# Patient Record
Sex: FEMALE | Race: OTHER | Employment: FULL TIME | URBAN - METROPOLITAN AREA
[De-identification: names, ages, dates, MRNs, and addresses within clinical notes are randomized per-mention and may not be internally consistent; named-entity substitution may affect disease eponyms.]

---

## 2019-10-29 ENCOUNTER — HOSPITAL ENCOUNTER (OUTPATIENT)
Dept: PHYSICAL THERAPY | Age: 69
Discharge: HOME | End: 2019-10-29
Payer: MEDICARE

## 2019-10-29 PROCEDURE — 97140 MANUAL THERAPY 1/> REGIONS: CPT

## 2019-10-29 PROCEDURE — 97162 PT EVAL MOD COMPLEX 30 MIN: CPT

## 2019-10-29 PROCEDURE — 97110 THERAPEUTIC EXERCISES: CPT

## 2019-10-29 NOTE — PROGRESS NOTES
PT DAILY TREATMENT NOTE 10-18    Patient Name: eHidi Zhu  Date:10/29/2019  : 1950  [x]  Patient  Verified  Payor: Roddy Jacobo / Plan: [place] MEDICARE PART A & B / Product Type: Medicare /    In time:1004  Out time:1055  Total Treatment Time (min): 51  Visit #: 1 of 16    Medicare/BCBS Only   Total Timed Codes (min):  23 1:1 Treatment Time:  51       Treatment Area: Right knee pain [M25.561]    Physical Therapy Evaluation - Knee    SUBJECTIVE      Any medication changes, allergies to medications, adverse drug reactions, diagnosis change, or new procedure performed?: [x] No    [] Yes (see summary sheet for update)    Subjective functional status/changes:     PLOF: (I) Functional ADLs, (I) Self-Care ADLs, Work Full-Time, Driving, Ambulation without AD, No Fall History  Current Functional Status: (I) Driving  Work Hx: [employer] - Return back 10/28/2019 (primarily sedentary)  Living Situation: Lives in [facility] ([facility])  Comorbidities: Arthritis, Diabetes Type II, HTN,   Medications: Vicodin (PRN)    Objective: Patient presents s/p right knee patellofemoral resurfacing (DoS 10/1/2019) secondary to chronic functional limitations and pain. Paitent denies the following: s/s of infection, s/s of DVT/PE, falls, N/T, knee buckling. Patient reports an uncomplicated post-surgical recovery with receival of home health PT x2 weeks prior to vacation x10 days with patient return to [facility] 10/27/2019. Patient reports return back to work 10/28/2019 with patient primarily working a sedentary desk-job. Patient reports ambulation without AD. Patient reports continued complication of HEP as prescribed with ambulation up to 1 mile/day with work-related ADLs.     Pain Intensity (0-10, VAS): Current 0, Worst 7, Best 0    Patient Goals: \"I would like to gain more motion in my knee\"    OBJECTIVE EXAMINATION    BP: 138/82 mmHg  Posture/Observation:   Static Standing: Slight unweighting of the right LE    Gait: Right antalgic gait pattern without AD  Functional Tests:  1. SLS: Left 26 sec / Right 30 sec  2. Sit-to-Stand (low table, no UE assist): Ability to perform without UE assists    Surgical Incision: Appropriate healing of surgical incision without active nor dried drainage. Neurovascular Screen: Generalized intact sensation to light touch to bilateral lower leg and foot, Non-tender, supple calf upon palpation, 2+ Posterior tibialis pulse, 2+ Dorsalis pedis pulse    ROM / Strength [] Unable to assess                                                                             AROM                     MMT (1-5)    Left Right Left Right   Hip Flexion   5 5    Extension   NT NT    Abduction   3+ 3+    ER   5 5    IR   5 5   Knee Flexion 138 117 5 5    Extension 5(0) (0)5 5 4+   Ankle Dorsiflexion   5 5   *Assessed in supine    Palpation:   Warmth: Minimal warmth isolated to knee   Edema: Minimal non-pitting edema localized to right knee    Patellar Assessment:  Patellar Mobility:   R Patella: Hypomobile medial/lateral glide    Girth Measurements:    Joint line   Right  46 cm     OBJECTIVE    28 min [x]Eval                  []Re-Eval     15 min Therapeutic Exercise:  [x] See flow sheet : Patient educated regarding completion of prescribed HEP and provided with written HEP instructions, Patient educated regarding diagnosis and PT POC   Rationale: increase ROM and increase strength to improve the patients ability to improve ease with functional ADLs    8 min Manual Therapy:    Supine, Left Patellar Superior Grade I-II Mobilization (OPP)  Supine, Left Femur AP Grade I-II Mobilization (OPP)  Supine, Left Tibial Distraction (OPP)   Rationale: decrease pain, increase ROM, increase tissue extensibility and decrease edema  to improve ease with stair management.           With   [] TE   [] TA   [] neuro   [] other: Patient Education: [x] Review HEP    [] Progressed/Changed HEP based on:   [] positioning   [] body mechanics   [] transfers   [] heat/ice application    [] other:      Other Objective/Functional Measures: See objective above. Pain Level (0-10 scale) post treatment: 0    ASSESSMENT/Changes in Function: Patient presents s/p right knee patellofemoral resurfacing (DoS 10/1/2019) secondary to chronic pain and functional limitations. Normal neurovascular screen with appropriate healing of surgical incision. Right knee AROM 0-5-117 degrees with poor activation of quadriceps musculature and right antalgic gait with ambulation. To emphasize return of functional knee AROM and strength to promote patient ease with return to PLOF. Patient will continue to benefit from skilled PT services to modify and progress therapeutic interventions, address functional mobility deficits, address ROM deficits, address strength deficits, analyze and address soft tissue restrictions, analyze and cue movement patterns, analyze and modify body mechanics/ergonomics, assess and modify postural abnormalities, address imbalance/dizziness and instruct in home and community integration to attain remaining goals. [x]  See Plan of Care  []  See progress note/recertification  []  See Discharge Summary         Progress towards goals / Updated goals:    Short Term Goals: To be accomplished in 3-4 weeks:  1. Patient will subjectively report full compliance with prescribed HEP. Eval: HEP provided  2. Patient will demonstrate right knee AROM 0-130 degrees to improve ease with dressing. Eval: Right Knee AROM 0 - 5 - 117 degrees  3. Patient will demonstrate ability to perform supine SLR x10 without quadriceps lag to improve ease with curb management. Eval: Supine Quadriceps Isometric: Poor activation of quadriceps with gluteal compensation    Long Term Goals: To be accomplished in 7-8 weeks:  1. Patient will demonstrate a significant functional improvement as demonstrated by a score of >/= 73 on FOTO. Eval: FOTO = 72  2.  Patient will demonstrate left/right SLS (Airex) >/= 30 seconds to improve stability on uneven surfaces. Eval: Left SLS (non-Airex) = 26 sec / Right SLS (non-Airex) = 30 sec  3. Patient will demonstrate left/right hip abduction MMT >/= 4+/5 to improve stability with recreational ADLs.   Eval: Left Hip Abduction MMT = 3+/5, Right Hip Abduction MMT = 3+/5    PLAN  [x]  Upgrade activities as tolerated     []  Continue plan of care  []  Update interventions per flow sheet       []  Discharge due to:_  []  Other:_      Nora Aguilar, PT 10/29/2019  8:32 AM    Future Appointments   Date Time Provider Jt Byrnes   10/29/2019 10:00 AM Ángel Childers, PT [facility]

## 2019-10-31 ENCOUNTER — HOSPITAL ENCOUNTER (OUTPATIENT)
Dept: PHYSICAL THERAPY | Age: 69
Discharge: HOME | End: 2019-10-31
Payer: MEDICARE

## 2019-10-31 PROCEDURE — 97110 THERAPEUTIC EXERCISES: CPT

## 2019-10-31 PROCEDURE — 97112 NEUROMUSCULAR REEDUCATION: CPT

## 2019-10-31 PROCEDURE — 97140 MANUAL THERAPY 1/> REGIONS: CPT

## 2019-11-05 ENCOUNTER — HOSPITAL ENCOUNTER (OUTPATIENT)
Dept: PHYSICAL THERAPY | Age: 69
Discharge: HOME | End: 2019-11-05
Payer: MEDICARE

## 2019-11-05 PROCEDURE — 97112 NEUROMUSCULAR REEDUCATION: CPT

## 2019-11-05 PROCEDURE — 97140 MANUAL THERAPY 1/> REGIONS: CPT

## 2019-11-05 PROCEDURE — 97110 THERAPEUTIC EXERCISES: CPT

## 2019-11-05 NOTE — PROGRESS NOTES
PT DAILY TREATMENT NOTE 10-18    Patient Name: Lory Gates  Date:2019  : 1950  [x]  Patient  Verified  Payor: VA MEDICARE / Plan: VA MEDICARE PART A & B / Product Type: Medicare /    In time:350  Out time:442  Total Treatment Time (min): 46  Visit #: 3 of 16    Medicare/BCBS Only   Total Timed Codes (min):  52 1:1 Treatment Time:  40       Treatment Area: Right knee pain [M25.561]    SUBJECTIVE  Pain Level (0-10 scale): 1  Any medication changes, allergies to medications, adverse drug reactions, diagnosis change, or new procedure performed?: [x] No    [] Yes (see summary sheet for update)  Subjective functional status/changes:   [] No changes reported  Patient reports continued pain primarily along the medial aspect of her knee. OBJECTIVE    25 min Therapeutic Exercise:  [x] See flow sheet : Emphasis placed on improving available knee AROM and LE strength   Rationale: increase ROM and increase strength to improve the patients ability to improve ease with functional ADLs     17 min Neuromuscular Re-education:  [x]  See flow sheet : Emphasis placed on improving LE proprioceptive and kinesthetic awareness and static and dynamic stability   Rationale: increase ROM, increase strength, improve balance and increase proprioception  to improve the patients ability to improve ease with work-related ADLs     10 min Manual Therapy:    Supine, Left Patellar Superior Grade I-II Mobilization (OPP)  Supine, Left Femur AP Grade I-II Mobilization (OPP)  Supine, Left Tibial Distraction (OPP, with knee extension)   Rationale: decrease pain, increase ROM, increase tissue extensibility and decrease edema  to improve ease with stair management.         With   [] TE   [] TA   [] neuro   [] other: Patient Education: [x] Review HEP    [] Progressed/Changed HEP based on:   [] positioning   [] body mechanics   [] transfers   [] heat/ice application    [] other:      Other Objective/Functional Measures:   Right Knee AROM (post-manual): 0 - 120 degrees     Pain Level (0-10 scale) post treatment: 0    ASSESSMENT/Changes in Function: With ability to achieve right knee AROM 0-120 degrees post-manual with hypertonicity of the medial thigh musculature and palpable \"popping\" at the pes anserine at end-range extension. Provision of updated HEP to include prone knee extension hang to improve return to terminal knee extension ROM. Patient education in completion of scar tissue massage. Patient will continue to benefit from skilled PT services to modify and progress therapeutic interventions, address functional mobility deficits, address ROM deficits, address strength deficits, analyze and address soft tissue restrictions, analyze and cue movement patterns, analyze and modify body mechanics/ergonomics and assess and modify postural abnormalities to attain remaining goals. []  See Plan of Care  []  See progress note/recertification  []  See Discharge Summary         Progress towards goals / Updated goals:    Short Term Goals: To be accomplished in 3-4 weeks:  1. Patient will subjectively report full compliance with prescribed HEP. Eval: HEP provided   Current: Met, HEP performance reported as prescribed, 10/31/2019  2. Patient will demonstrate right knee AROM 0-130 degrees to improve ease with dressing. Eval: Right Knee AROM 0 - 5 - 117 degrees  Current: Remains, Right Knee AROM 0 - 5 - 117 degrees, 11/5/2019  3. Patient will demonstrate ability to perform supine SLR x10 without quadriceps lag to improve ease with curb management. Eval: Supine Quadriceps Isometric: Poor activation of quadriceps with gluteal compensation  Current: Progressing, Supine SLR x10 - Performance with quadriceps lag present, 11/5/2019     Long Term Goals: To be accomplished in 7-8 weeks:  1. Patient will demonstrate a significant functional improvement as demonstrated by a score of >/= 73 on FOTO. Eval: FOTO = 72  2.  Patient will demonstrate left/right SLS (Airex) >/= 30 seconds to improve stability on uneven surfaces. Eval: Left SLS (non-Airex) = 26 sec / Right SLS (non-Airex) = 30 sec  3. Patient will demonstrate left/right hip abduction MMT >/= 4+/5 to improve stability with recreational ADLs.   Eval: Left Hip Abduction MMT = 3+/5, Right Hip Abduction MMT = 3+/5    PLAN  [x]  Upgrade activities as tolerated     [x]  Continue plan of care  []  Update interventions per flow sheet       []  Discharge due to:_  []  Other:_      Pal Talamantes, PT 11/5/2019  8:27 AM    Future Appointments   Date Time Provider Jt Byrnes   11/5/2019  4:00 PM Peterson Bernardo, PT [facility]   11/7/2019  8:00 AM Grant, [address] [facility]   11/11/2019  4:00 PM Serg Abbasi, PT [facility]   11/13/2019  1:30 PM Raj Broderick, PT [facility]

## 2019-11-07 ENCOUNTER — HOSPITAL ENCOUNTER (OUTPATIENT)
Dept: PHYSICAL THERAPY | Age: 69
Discharge: HOME | End: 2019-11-07
Payer: MEDICARE

## 2019-11-07 PROCEDURE — 97110 THERAPEUTIC EXERCISES: CPT

## 2019-11-07 PROCEDURE — 97140 MANUAL THERAPY 1/> REGIONS: CPT

## 2019-11-11 ENCOUNTER — HOSPITAL ENCOUNTER (OUTPATIENT)
Dept: PHYSICAL THERAPY | Age: 69
End: 2019-11-11
Payer: MEDICARE

## 2019-11-13 ENCOUNTER — APPOINTMENT (OUTPATIENT)
Dept: PHYSICAL THERAPY | Age: 69
End: 2019-11-13
Payer: MEDICARE

## 2019-11-13 NOTE — PROGRESS NOTES
[facility]              [address]        [place], [address]   [phone] fax    Discharge Summary  Patient name: Ginette Thurman Start of Care: 10/29/2019   Referral source: Sylvie Rosas MD : 1950               Medical Diagnosis: Right knee pain [M25.561]  Payor: VA MEDICARE / Plan: VA MEDICARE PART A & B / Product Type: Medicare /  Onset Date:DoS 10/1/2019               Treatment Diagnosis: Right Knee Pain, s/p Surgery   Prior Hospitalization: see medical history Provider#: [id number]   Medications: Verified on Patient summary List    Comorbidities: Arthritis, Diabetes Type II, HTN   Prior Level of Function: (I) Functional ADLs, (I) Self-Care ADLs, Work Full-Time, Driving, Ambulation without AD, No Fall History  Visits from [place] of Care: 4    Missed Visits: 2  Reporting Period : 10/29/2019 to 2019    Summary of Care:  Goal: Patient will subjectively report full compliance with prescribed HEP. Status at last note/certification: unable to reassess  Status at discharge: not met    Goal: Patient will demonstrate right knee AROM 0-130 degrees to improve ease with dressing. Status at last note/certification: unable to reassess  Status at discharge: not met    Goal: Patient will demonstrate ability to perform supine SLR x10 without quadriceps lag to improve ease with curb management. Status at last note/certification: unable to reassess  Status at discharge: not met    Goal: Patient will demonstrate a significant functional improvement as demonstrated by a score of >/= 73 on FOTO. Status at last note/certification: unable to reassess  Status at discharge: not met    Goal: Patient will demonstrate left/right SLS (Airex) >/= 30 seconds to improve stability on uneven surfaces.   Status at last note/certification: unable to reassess  Status at discharge: not met    Goal: Patient will demonstrate left/right hip abduction MMT >/= 4+/5 to improve stability with recreational ADLs. Status at last note/certification: unable to reassess  Status at discharge: not met    ASSESSMENT/RECOMMENDATIONS:  Pt was seen for 4 therapy sessions. Per telephone log, the pt called and cancelled all therapy appointments secondary to seeing her MD and he requested d/c. Pt is therefore d/c'ed from therapy and unable to reassess goals at this time.    [x]Discontinue therapy: []Patient has reached or is progressing toward set goals      [x]Patient is non-compliant or has abdicated      []Due to lack of appreciable progress towards set goals    Jonathan Jaramillo, PT 11/13/2019 3:40 PM

## 2020-10-22 ENCOUNTER — TELEPHONE (OUTPATIENT)
Dept: ORTHOPEDIC SURGERY | Age: 70
End: 2020-10-22

## 2020-10-22 ENCOUNTER — OFFICE VISIT (OUTPATIENT)
Dept: ORTHOPEDIC SURGERY | Age: 70
End: 2020-10-22
Payer: MEDICARE

## 2020-10-22 VITALS
DIASTOLIC BLOOD PRESSURE: 81 MMHG | RESPIRATION RATE: 16 BRPM | TEMPERATURE: 97 F | HEIGHT: 65 IN | SYSTOLIC BLOOD PRESSURE: 148 MMHG | OXYGEN SATURATION: 99 % | WEIGHT: 180 LBS | HEART RATE: 78 BPM | BODY MASS INDEX: 29.99 KG/M2

## 2020-10-22 DIAGNOSIS — M47.816 LUMBAR FACET ARTHROPATHY: Primary | ICD-10-CM

## 2020-10-22 DIAGNOSIS — M51.36 DDD (DEGENERATIVE DISC DISEASE), LUMBAR: ICD-10-CM

## 2020-10-22 DIAGNOSIS — R20.0 RIGHT LEG NUMBNESS: ICD-10-CM

## 2020-10-22 DIAGNOSIS — G62.9 NEUROPATHY: ICD-10-CM

## 2020-10-22 PROCEDURE — 99203 OFFICE O/P NEW LOW 30 MIN: CPT | Performed by: PHYSICAL MEDICINE & REHABILITATION

## 2020-10-22 PROCEDURE — G8536 NO DOC ELDER MAL SCRN: HCPCS | Performed by: PHYSICAL MEDICINE & REHABILITATION

## 2020-10-22 PROCEDURE — 1090F PRES/ABSN URINE INCON ASSESS: CPT | Performed by: PHYSICAL MEDICINE & REHABILITATION

## 2020-10-22 PROCEDURE — G8427 DOCREV CUR MEDS BY ELIG CLIN: HCPCS | Performed by: PHYSICAL MEDICINE & REHABILITATION

## 2020-10-22 PROCEDURE — 1101F PT FALLS ASSESS-DOCD LE1/YR: CPT | Performed by: PHYSICAL MEDICINE & REHABILITATION

## 2020-10-22 PROCEDURE — G8432 DEP SCR NOT DOC, RNG: HCPCS | Performed by: PHYSICAL MEDICINE & REHABILITATION

## 2020-10-22 PROCEDURE — G8400 PT W/DXA NO RESULTS DOC: HCPCS | Performed by: PHYSICAL MEDICINE & REHABILITATION

## 2020-10-22 PROCEDURE — G8419 CALC BMI OUT NRM PARAM NOF/U: HCPCS | Performed by: PHYSICAL MEDICINE & REHABILITATION

## 2020-10-22 PROCEDURE — 3017F COLORECTAL CA SCREEN DOC REV: CPT | Performed by: PHYSICAL MEDICINE & REHABILITATION

## 2020-10-22 RX ORDER — GLIPIZIDE 5 MG/1
TABLET, FILM COATED, EXTENDED RELEASE ORAL
COMMUNITY
Start: 2020-07-13

## 2020-10-22 RX ORDER — GABAPENTIN 100 MG/1
CAPSULE ORAL
Qty: 60 CAP | Refills: 2 | Status: SHIPPED | OUTPATIENT
Start: 2020-10-22 | End: 2021-10-12

## 2020-10-22 NOTE — LETTER
10/24/20 Patient: Colton Gates YOB: 1950 Date of Visit: 10/22/2020 Ketty Peterson NP 
[address] [facility] [address] [address] [address] VIA Facsimile: [phone] Dear Ketty Peterson NP, Thank you for referring Ms. Doron Jeffrey to [address] [place] [address] for evaluation. My notes for this consultation are attached. If you have questions, please do not hesitate to call me. I look forward to following your patient along with you. Sincerely, uJlissa Kapadia MD

## 2020-10-22 NOTE — PROGRESS NOTES
[facility]  [address], [facility] [address]  Phone: [phone]  Fax: [phone]    NEW PATIENT  Pt's YOB: 1950    ASSESSMENT   Diagnoses and all orders for this visit:    1. Lumbar facet arthropathy  -     MRI LUMB SPINE WO CONT; Future    2. Neuropathy  -     gabapentin (Neurontin) 100 mg capsule; Take 1 cap by mouth at night for 1 week, then increase to 2 as directed. -     MRI LUMB SPINE WO CONT; Future    3. DDD (degenerative disc disease), lumbar  -     MRI LUMB SPINE WO CONT; Future    4. Right leg numbness  -     MRI LUMB SPINE WO CONT; Future         IMPRESSION AND PLAN:  Inderjit Goldstein is a 78 y.o. female with history of low back pain. She complains of pain across the lower back that is severe when standing for prolonged periods of time. Pt also reports sharp pain in the left buttock and numbness in the right lateral thigh x 3 months. She has used Voltaren 1% gel and Voltaren 75 mg without relief. 1) Pt was given information on lumbar arthritis exercises. 2) A lumbar MRI was ordered. She has progressive lumbar pain with radicular symptoms and difficulty standing/walking despite using NSAID's.  3) Pt was prescribed Neurontin 100 mg 2 tab QHS, tapering up as directed. 4) She was offered a Medrol Dosepak but declined. 5) Ms. Tennille Mason has a reminder for a \"due or due soon\" health maintenance. I have asked that she contact her primary care provider, Federico Patrick NP, for follow-up on this health maintenance. 6)  demonstrated consistency with prescribing. Follow-up and Dispositions    · Return in about 4 weeks (around 11/19/2020) for Diagnostic Test follow up, Medication follow up. HISTORY OF PRESENT ILLNESS:  Inderjit Goldstein is a 78 y.o. female with history of low back pain. Pt presents to the office today as a new patient referred by Dr. Judith Ocampo.  She complains of pain across the lower back that is severe when standing/walking for prolonged periods of time. Pt notes severe lower back when changing positions and difficulty getting into bed secondary to pain. She admits to increased pain when laying supine but reports minimal pain upon waking when she lays on her sides. Pt also reports sharp pain in the left buttock and numbness in the right lateral thigh x 3 months. She reports frequent numbness/tingling in the feet that starts around 6-7 PM and is unsure if this could be due to neuropathy. Pt denies any recent lower extremity EMGs. Of note, she had a right partial knee replacement 10/1/2019 by Dr. Kathyanne Hammans. She denies any weakness in the legs at this time. She had cervical surgery by Dr. Afshin Weaver but denies any previous lumbar surgeries. Pt has used Voltaren 1% gel and Voltaren 75 mg without relief. She denies any previous physical therapy. She has a history of diabetes mellitus and notes that her blood sugars are well controlled at this time. Pt notes that she did not previously tolerate Lyrica but denies ever taking Neurontin. She has a history of [place] Palsy and notes that she has been on prednisone 3 x over the last couple years due to a failed eye surgery. Pt reports that she is now followed by Dr. Cristofer Green. Pt at this time desires to proceed with a lumbar MRI and medication evaluation. She will be leaving tomorrow for a trip to [place], [place]. Pt notes that she will be going on a [employer] train ride.     Pain Scale: 6/10     PCP: Herman Eugene NP    Past Medical History:   Diagnosis Date    Arthritis     Asthma     As a child    Diabetes ([facility] [address]     High cholesterol     Hypertension     Numbness of left hand         Social History     Socioeconomic History    Marital status:      Spouse name: Not on file    Number of children: Not on file    Years of education: Not on file    Highest education level: Not on file   Occupational History    Occupation: office work Comment: at [employer] 8 resource strain: Not on file    Food insecurity     Worry: Not on file     Inability: Not on file   [employer] needs     Medical: Not on file     Non-medical: Not on file   Tobacco Use    Smoking status: Never Smoker    Smokeless tobacco: Never Used   Substance and Sexual Activity    Alcohol use: No    Drug use: No    Sexual activity: Not on file   Lifestyle    Physical activity     Days per week: Not on file     Minutes per session: Not on file    Stress: Not on file   Relationships    Social connections     Talks on phone: Not on file     Gets together: Not on file     Attends Latter-day service: Not on file     Active member of club or organization: Not on file     Attends meetings of clubs or organizations: Not on file     Relationship status: Not on file    Intimate partner violence     Fear of current or ex partner: Not on file     Emotionally abused: Not on file     Physically abused: Not on file     Forced sexual activity: Not on file   Other Topics Concern    Not on file   Social History Narrative    Not on file       Current Outpatient Medications   Medication Sig Dispense Refill    glipiZIDE SR (GLUCOTROL XL) 5 mg CR tablet TAKE 1 TABLET DAILY      gabapentin (Neurontin) 100 mg capsule Take 1 cap by mouth at night for 1 week, then increase to 2 as directed. 60 Cap 2    fenofibrate nanocrystallized (TRICOR) 145 mg tablet 145 mg.      losartan-hydrochlorothiazide (HYZAAR) 100-12.5 mg per tablet Take 1 Tab by mouth daily. 1    LORATADINE PO Take 10 mg by mouth daily as needed.  atorvastatin (LIPITOR) 20 mg tablet Take  by mouth daily.  BABY ASPIRIN PO Take 81 mg by mouth daily.  metFORMIN ER (GLUCOPHAGE XR) 500 mg tablet   6    HYDROcodone-acetaminophen (NORCO) 7.5-325 mg per tablet Take 1 Tab by mouth every eight (8) hours as needed for Pain. Max Daily Amount: 3 Tabs.  60 Tab 0    HYDROcodone-acetaminophen (VICODIN ES) 7.5-300 mg tablet Take 1 Tab by mouth four (4) times daily as needed. Max Daily Amount: 4 Tabs. 30 Tab 0    metFORMIN (GLUCOPHAGE) 500 mg tablet Take  by mouth two (2) times daily (with meals).  FENOFIBRATE PO Take 145 mg by mouth daily. Allergies   Allergen Reactions    Cat Dander Other (comments)     eyes swell       REVIEW OF SYSTEMS    Constitutional: Negative for fever, chills, or weight change. Respiratory: Negative for cough or shortness of breath. Cardiovascular: Negative for chest pain or palpitations. Gastrointestinal: Negative for acid reflux, change in bowel habits, or constipation. Genitourinary: Negative for dysuria and flank pain. Musculoskeletal: Positive for lumbar pain. Skin: Negative for rash. Neurological: Negative for headaches or dizziness, Positive for numbness in the lateral aspect of the right thigh. Endo/Heme/Allergies: Negative for increased bruising. Psychiatric/Behavioral: Negative for difficulty with sleep. PHYSICAL EXAMINATION  Visit Vitals  BP (!) 148/81 (BP 1 Location: Left arm, BP Patient Position: Sitting)   Pulse 78   Temp 97 °F (36.1 °C) (Skin)   Resp 16   Ht 5' 5\" (1.651 m)   Wt 180 lb (81.6 kg)   SpO2 99%   BMI 29.95 kg/m²       Constitutional: Awake, alert, and in no acute distress. HEENT: Normocephalic. Atraumatic. Oropharynx is moist and clear. PERRL. EOMI. Sclerae are nonicteric  Cardiovascular: Regular rate and rhythm  Lungs: Clear to auscultation bilaterally  Abdomen: Soft and nontender. Bowel sounds are present  Neurological: 1+ symmetrical DTRs in the upper extremities. 1+ symmetrical DTRs in the lower extremities. Sensation to light touch is intact. Negative Guzmán's sign bilaterally. Skin: warm, dry, and intact. Musculoskeletal: Tenderness to palpation in the lower lumbar region. Moderate pain with extension and axial loading. No pain with internal or external rotation of her hips. Negative straight leg raise bilaterally. Biceps  Triceps Deltoids Wrist Ext Wrist Flex Hand Intrin   Right +4/5 +4/5 +4/5 +4/5 +4/5 +4/5   Left +4/5 +4/5 +4/5 +4/5 +4/5 +4/5      Hip Flex  Quads Hamstrings Ankle DF EHL Ankle PF   Right +4/5 +4/5 +4/5 +4/5 +4/5 +4/5   Left +4/5 +4/5 +4/5 +4/5 +4/5 +4/5     IMAGING:    Lumbar spine x-rays from 9/22/2020 DR JUAREZ [facility]) were personally reviewed with the patient and demonstrated:  FINDINGS:    VERTEBRAE AND ALIGNMENT: Segments are normal in height and alignment. Minimal L4-5 anterolisthesis. Facet hypertrophy L4-5 and L5-S1 levels. DISC SPACES: Little or no significant disc space narrowing. Marginal osteophytes noted at multiple levels. PARASPINOUS SOFT TISSUES: Unremarkable. ADDITIONAL: None.    _______________    IMPRESSION    Facet hypertrophy lower lumbar spine. Minimal L4-5 anterolisthesis. Minimal marginal osteophytes. Written by Blank Smith, as dictated by Soraida Melton MD.  I, Dr. Soraida Melton confirm that all documentation is accurate.

## 2020-10-22 NOTE — PATIENT INSTRUCTIONS
Low Back Arthritis: Exercises Introduction Here are some examples of typical rehabilitation exercises for your condition. Start each exercise slowly. Ease off the exercise if you start to have pain. Your doctor or physical therapist will tell you when you can start these exercises and which ones will work best for you. When you are not being active, find a comfortable position for rest. Some people are comfortable on the floor or a medium-firm bed with a small pillow under their head and another under their knees. Some people prefer to lie on their side with a pillow between their knees. Don't stay in one position for too long. Take short walks (10 to 20 minutes) every 2 to 3 hours. Avoid slopes, hills, and stairs until you feel better. Walk only distances you can manage without pain, especially leg pain. How to do the exercises Pelvic tilt 1. Lie on your back with your knees bent. 2. \"Brace\" your stomachtighten your muscles by pulling in and imagining your belly button moving toward your spine. 3. Press your lower back into the floor. You should feel your hips and pelvis rock back. 4. Hold for 6 seconds while breathing smoothly. 5. Relax and allow your pelvis and hips to rock forward. 6. Repeat 8 to 12 times. Back stretches 1. Get down on your hands and knees on the floor. 2. Relax your head and allow it to droop. Round your back up toward the ceiling until you feel a nice stretch in your upper, middle, and lower back. Hold this stretch for as long as it feels comfortable, or about 15 to 30 seconds. 3. Return to the starting position with a flat back while you are on your hands and knees. 4. Let your back sway by pressing your stomach toward the floor. Lift your buttocks toward the ceiling. 5. Hold this position for 15 to 30 seconds. 6. Repeat 2 to 4 times. Follow-up care is a key part of your treatment and safety.  Be sure to make and go to all appointments, and call your doctor if you are having problems. It's also a good idea to know your test results and keep a list of the medicines you take. Where can you learn more? Go to http://www.gray.com/ Enter D528 in the search box to learn more about \"Low Back Arthritis: Exercises. \" Current as of: March 2, 2020               Content Version: 12.6 © 2006-2020 [employer]. Care instructions adapted under license by [employer] (which disclaims liability or warranty for this information). If you have questions about a medical condition or this instruction, always ask your healthcare professional. [address] any warranty or liability for your use of this information.

## 2020-10-22 NOTE — TELEPHONE ENCOUNTER
MRI of the Lumbar Spine without contrast is scheduled at [facility], [phone], on 11/11/20, arrive 7:15AM, test 7:45AM. No Medicare pre-authorization required. Patient instructed to obtain disk. Follow-up appointment scheduled with Dr. Gibbs. Patient advised to bring disk.

## 2020-10-29 DIAGNOSIS — M47.816 LUMBAR FACET ARTHROPATHY: ICD-10-CM

## 2020-10-29 DIAGNOSIS — R20.0 RIGHT LEG NUMBNESS: ICD-10-CM

## 2020-10-29 DIAGNOSIS — M51.36 DDD (DEGENERATIVE DISC DISEASE), LUMBAR: ICD-10-CM

## 2020-10-29 DIAGNOSIS — G62.9 NEUROPATHY: ICD-10-CM

## 2020-11-16 ENCOUNTER — OFFICE VISIT (OUTPATIENT)
Dept: ORTHOPEDIC SURGERY | Age: 70
End: 2020-11-16
Payer: MEDICARE

## 2020-11-16 VITALS
HEIGHT: 65 IN | HEART RATE: 81 BPM | WEIGHT: 180 LBS | TEMPERATURE: 97.7 F | RESPIRATION RATE: 26 BRPM | SYSTOLIC BLOOD PRESSURE: 154 MMHG | BODY MASS INDEX: 29.99 KG/M2 | OXYGEN SATURATION: 98 % | DIASTOLIC BLOOD PRESSURE: 78 MMHG

## 2020-11-16 DIAGNOSIS — M62.838 MUSCLE SPASM: ICD-10-CM

## 2020-11-16 DIAGNOSIS — M51.36 BULGE OF LUMBAR DISC WITHOUT MYELOPATHY: ICD-10-CM

## 2020-11-16 DIAGNOSIS — M54.16 LUMBAR RADICULOPATHY: ICD-10-CM

## 2020-11-16 DIAGNOSIS — M47.816 LUMBAR FACET ARTHROPATHY: Primary | ICD-10-CM

## 2020-11-16 PROCEDURE — 1101F PT FALLS ASSESS-DOCD LE1/YR: CPT | Performed by: PHYSICAL MEDICINE & REHABILITATION

## 2020-11-16 PROCEDURE — G8432 DEP SCR NOT DOC, RNG: HCPCS | Performed by: PHYSICAL MEDICINE & REHABILITATION

## 2020-11-16 PROCEDURE — 3017F COLORECTAL CA SCREEN DOC REV: CPT | Performed by: PHYSICAL MEDICINE & REHABILITATION

## 2020-11-16 PROCEDURE — G8536 NO DOC ELDER MAL SCRN: HCPCS | Performed by: PHYSICAL MEDICINE & REHABILITATION

## 2020-11-16 PROCEDURE — G8427 DOCREV CUR MEDS BY ELIG CLIN: HCPCS | Performed by: PHYSICAL MEDICINE & REHABILITATION

## 2020-11-16 PROCEDURE — G8419 CALC BMI OUT NRM PARAM NOF/U: HCPCS | Performed by: PHYSICAL MEDICINE & REHABILITATION

## 2020-11-16 PROCEDURE — 99214 OFFICE O/P EST MOD 30 MIN: CPT | Performed by: PHYSICAL MEDICINE & REHABILITATION

## 2020-11-16 PROCEDURE — 1090F PRES/ABSN URINE INCON ASSESS: CPT | Performed by: PHYSICAL MEDICINE & REHABILITATION

## 2020-11-16 PROCEDURE — G8400 PT W/DXA NO RESULTS DOC: HCPCS | Performed by: PHYSICAL MEDICINE & REHABILITATION

## 2020-11-16 RX ORDER — CELECOXIB 200 MG/1
200 CAPSULE ORAL 2 TIMES DAILY WITH MEALS
Qty: 60 CAP | Refills: 1 | Status: SHIPPED | OUTPATIENT
Start: 2020-11-16 | End: 2021-10-12

## 2020-11-16 RX ORDER — TOPIRAMATE 25 MG/1
TABLET ORAL
Qty: 90 TAB | Refills: 1 | Status: SHIPPED | OUTPATIENT
Start: 2020-11-16 | End: 2021-10-12

## 2020-11-16 NOTE — PATIENT INSTRUCTIONS
Low Back Arthritis: Exercises Introduction Here are some examples of typical rehabilitation exercises for your condition. Start each exercise slowly. Ease off the exercise if you start to have pain. Your doctor or physical therapist will tell you when you can start these exercises and which ones will work best for you. When you are not being active, find a comfortable position for rest. Some people are comfortable on the floor or a medium-firm bed with a small pillow under their head and another under their knees. Some people prefer to lie on their side with a pillow between their knees. Don't stay in one position for too long. Take short walks (10 to 20 minutes) every 2 to 3 hours. Avoid slopes, hills, and stairs until you feel better. Walk only distances you can manage without pain, especially leg pain. How to do the exercises Pelvic tilt 1. Lie on your back with your knees bent. 2. \"Brace\" your stomachtighten your muscles by pulling in and imagining your belly button moving toward your spine. 3. Press your lower back into the floor. You should feel your hips and pelvis rock back. 4. Hold for 6 seconds while breathing smoothly. 5. Relax and allow your pelvis and hips to rock forward. 6. Repeat 8 to 12 times. Back stretches 1. Get down on your hands and knees on the floor. 2. Relax your head and allow it to droop. Round your back up toward the ceiling until you feel a nice stretch in your upper, middle, and lower back. Hold this stretch for as long as it feels comfortable, or about 15 to 30 seconds. 3. Return to the starting position with a flat back while you are on your hands and knees. 4. Let your back sway by pressing your stomach toward the floor. Lift your buttocks toward the ceiling. 5. Hold this position for 15 to 30 seconds. 6. Repeat 2 to 4 times. Follow-up care is a key part of your treatment and safety.  Be sure to make and go to all appointments, and call your doctor if you are having problems. It's also a good idea to know your test results and keep a list of the medicines you take. Where can you learn more? Go to http://www.gray.com/ Enter D547 in the search box to learn more about \"Low Back Arthritis: Exercises. \" Current as of: March 2, 2020               Content Version: 12.6 © 2006-2020 [employer]. Care instructions adapted under license by [employer] (which disclaims liability or warranty for this information). If you have questions about a medical condition or this instruction, always ask your healthcare professional. [address] any warranty or liability for your use of this information. Sacroiliac Pain: Exercises Introduction Here are some examples of exercises for you to try. The exercises may be suggested for a condition or for rehabilitation. Start each exercise slowly. Ease off the exercises if you start to have pain. You will be told when to start these exercises and which ones will work best for you. How to do the exercises Knee-to-chest stretch 1. Do not do the knee-to-chest exercise if it causes or increases back or leg pain. 2. Lie on your back with your knees bent and your feet flat on the floor. You can put a small pillow under your head and neck if it is more comfortable. 3. Grasp your hands under one knee and bring the knee to your chest, keeping the other foot flat on the floor. 4. Keep your lower back pressed to the floor. Hold for at least 15 to 30 seconds. 5. Relax and lower the knee to the starting position. Repeat with the other leg. 6. Repeat 2 to 4 times with each leg. 7. To get more stretch, keep your other leg flat on the floor while pulling your knee to your chest.   
Bridging 1. Lie on your back with both knees bent. Your knees should be bent about 90 degrees. 2. Tighten your belly muscles by pulling in your belly button toward your spine. Then push your feet into the floor, squeeze your buttocks, and lift your hips off the floor until your shoulders, hips, and knees are all in a straight line. 3. Hold for about 6 seconds as you continue to breathe normally, and then slowly lower your hips back down to the floor and rest for up to 10 seconds. 4. Repeat 8 to 12 times. Hip extension 1. Get down on your hands and knees on the floor. 2. Keeping your back and neck straight, lift one leg straight out behind you. When you lift your leg, keep your hips level. Don't let your back twist, and don't let your hip drop toward the floor. 3. Hold for 6 seconds. Repeat 8 to 12 times with each leg. 4. If you feel steady and strong when you do this exercise, you can make it more difficult. To do this, when you lift your leg, also lift the opposite arm straight out in front of you. For example, lift the left leg and the right arm at the same time. (This is sometimes called the \"bird dog exercise. \") Hold for 6 seconds, and repeat 8 to 12 times on each side. Clamshell 1. Lie on your side with a pillow under your head. Keep your feet and knees together and your knees bent. 2. Raise your top knee, but keep your feet together. Do not let your hips roll back. Your legs should open up like a clamshell. 3. Hold for 6 seconds. 4. Slowly lower your knee back down. Rest for 10 seconds. 5. Repeat 8 to 12 times. 6. Switch to your other side and repeat steps 1 through 5. Hamstring wall stretch 1. Lie on your back in a doorway, with one leg through the open door. 2. Slide your affected leg up the wall to straighten your knee. You should feel a gentle stretch down the back of your leg. 3. Hold the stretch for at least 1 minute to begin. Then try to lengthen the time you hold the stretch to as long as 6 minutes. 4. Switch legs, and repeat steps 1 through 3. 5. Repeat 2 to 4 times. 6. If you do not have a place to do this exercise in a doorway, there is another way to do it: 
7. Lie on your back, and bend one knee. 8. Loop a towel under the ball and toes of that foot, and hold the ends of the towel in your hands. 9. Straighten your knee, and slowly pull back on the towel. You should feel a gentle stretch down the back of your leg. 10. Switch legs, and repeat steps 1 through 3. 
11. Repeat 2 to 4 times. 1. Do not arch your back. 2. Do not bend either knee. 3. Keep one heel touching the floor and the other heel touching the wall. Do not point your toes. Lower abdominal strengthening 1. Lie on your back with your knees bent and your feet flat on the floor. 2. Tighten your belly muscles by pulling your belly button in toward your spine. 3. Lift one foot off the floor and bring your knee toward your chest, so that your knee is straight above your hip and your leg is bent like the letter \"L. \" 
4. Lift the other knee up to the same position. 5. Lower one leg at a time to the starting position. 6. Keep alternating legs until you have lifted each leg 8 to 12 times. 7. Be sure to keep your belly muscles tight and your back still as you are moving your legs. Be sure to breathe normally. Piriformis stretch 1. Lie on your back with your legs straight. 2. Lift your affected leg, and bend your knee. With your opposite hand, reach across your body, and then gently pull your knee toward your opposite shoulder. 3. Hold the stretch for 15 to 30 seconds. 4. Switch legs and repeat steps 1 through 3. 
5. Repeat 2 to 4 times. Follow-up care is a key part of your treatment and safety. Be sure to make and go to all appointments, and call your doctor if you are having problems. It's also a good idea to know your test results and keep a list of the medicines you take. Where can you learn more? Go to http://www.[employer].Fariqak/ Enter Y366 in the search box to learn more about \"Sacroiliac Pain: Exercises. \" Current as of: March 2, 2020               Content Version: 12.6 © 1027-7379 [employer]. Care instructions adapted under license by [employer] (which disclaims liability or warranty for this information). If you have questions about a medical condition or this instruction, always ask your healthcare professional. [place] [address] any warranty or liability for your use of this information.

## 2020-11-16 NOTE — PROGRESS NOTES
[facility]  [address] [place], [facility] [address]  Phone: [phone]  Fax: [phone]    Pt's YOB: 1950    ASSESSMENT   Diagnoses and all orders for this visit:    1. Lumbar facet arthropathy  -     celecoxib (CeleBREX) 200 mg capsule; Take 1 Cap by mouth two (2) times daily (with meals). 2. Lumbar radiculopathy  -     topiramate (TOPAMAX) 25 mg tablet; Take 1 in the evening for 1 week, then increase to 2 the second week and continue with 3 in the evening    3. Muscle spasm    4. Bulge of lumbar disc without myelopathy    5. BMI 29.0-29.9,adult         IMPRESSION AND PLAN:  Federico Peterson is a 78 y.o. female with history of lumbar pain. She complains of pain across the lower back that is severe when standing for prolonged periods of time. Pt also reports sharp pain in the left buttock that occasionally radiates down the left leg to the calf. She did not tolerate Neurontin. 1) Pt was given information on lumbar arthritis and sacroiliac exercises. 2) Discussed treatment options with the patient including medication, physical therapy, steroid injections, and a lumbar RFA. 3) She was offered a referral to aquatic lumbar physical therapy but declined due to her work schedule. 4) Pt was prescribed Topamax 25 mg 3 tabs QHS, tapering up as directed. 5) She may use an OTC lumbar support intermittently as needed. 6) Pt was prescribed Celebrex 200 mg 1 cap . 7) Ms. Darden has a reminder for a \"due or due soon\" health maintenance. I have asked that she contact her primary care provider, Sameera Phipps NP, for follow-up on this health maintenance. 8)  demonstrated consistency with prescribing.   Follow-up and Dispositions    · Return in about 4 weeks (around 12/14/2020) for Medication follow up.         30 minutes of face to face contact was spent with the patient during today's office visit extensively discussing her symptoms, medications, reviewing her MRI, discussing various therapeutics and treatment plan. HISTORY OF PRESENT ILLNESS:  Inderjit Goldstein is a 78 y.o. female with history of lumbar pain and presents to the office today for MRI follow up. She complains of pain across the lower back that is severe when standing for prolonged periods of time. Pt also reports sharp pain in the left buttock that occasionally radiates down the left leg to the calf. Pt notes that she often has to take a break while washing dishes to sit down secondary to pain. She also reports pain when laying supine for extended periods of time. Pt denies previously attending lumbar physical therapy. She admits that her work schedule at the [place] will likely interfere with her ability to attend physical therapy. Pt started Neurontin 100 mg 2 caps QHS since her last office visit but admits to feeling loopy while on the medication. She has used Voltaren 75 mg and Voltaren gel without relief. Pt denies any allergies to sulfa and has never taking Celebrex. She admits that she has only experienced relief when taking hydrocodone. Pt at this time desires to proceed with medication evaluation.     Pain Scale: 3/10    PCP: Federico Patrick NP     Past Medical History:   Diagnosis Date    Arthritis     Asthma     As a child    Diabetes ([address]     High cholesterol     Hypertension     Numbness of left hand         Social History     Socioeconomic History    Marital status:      Spouse name: Not on file    Number of children: Not on file    Years of education: Not on file    Highest education level: Not on file   Occupational History    Occupation: office work     Comment: at [address] Financial resource strain: Not on file    Food insecurity     Worry: Not on file     Inability: Not on file   Shopular needs     Medical: Not on file     Non-medical: Not on file   Tobacco Use    Smoking status: Never Smoker    Smokeless tobacco: Never Used   Substance and Sexual Activity    Alcohol use: No    Drug use: No    Sexual activity: Not on file   Lifestyle    Physical activity     Days per week: Not on file     Minutes per session: Not on file    Stress: Not on file   Relationships    Social connections     Talks on phone: Not on file     Gets together: Not on file     Attends Gnosticism service: Not on file     Active member of club or organization: Not on file     Attends meetings of clubs or organizations: Not on file     Relationship status: Not on file    Intimate partner violence     Fear of current or ex partner: Not on file     Emotionally abused: Not on file     Physically abused: Not on file     Forced sexual activity: Not on file   Other Topics Concern    Not on file   Social History Narrative    Not on file       Current Outpatient Medications   Medication Sig Dispense Refill    topiramate (TOPAMAX) 25 mg tablet Take 1 in the evening for 1 week, then increase to 2 the second week and continue with 3 in the evening 90 Tab 1    celecoxib (CeleBREX) 200 mg capsule Take 1 Cap by mouth two (2) times daily (with meals). 60 Cap 1    glipiZIDE SR (GLUCOTROL XL) 5 mg CR tablet TAKE 1 TABLET DAILY      fenofibrate nanocrystallized (TRICOR) 145 mg tablet 145 mg.      losartan-hydrochlorothiazide (HYZAAR) 100-12.5 mg per tablet Take 1 Tab by mouth daily. 1    LORATADINE PO Take 10 mg by mouth daily as needed.  atorvastatin (LIPITOR) 20 mg tablet Take 20 mg by mouth daily.  gabapentin (Neurontin) 100 mg capsule Take 1 cap by mouth at night for 1 week, then increase to 2 as directed. 60 Cap 2    metFORMIN ER (GLUCOPHAGE XR) 500 mg tablet   6    HYDROcodone-acetaminophen (NORCO) 7.5-325 mg per tablet Take 1 Tab by mouth every eight (8) hours as needed for Pain. Max Daily Amount: 3 Tabs. 60 Tab 0    HYDROcodone-acetaminophen (VICODIN ES) 7.5-300 mg tablet Take 1 Tab by mouth four (4) times daily as needed.  Max Daily Amount: 4 Tabs. 30 Tab 0    metFORMIN (GLUCOPHAGE) 500 mg tablet Take  by mouth two (2) times daily (with meals).  FENOFIBRATE PO Take 145 mg by mouth daily.  BABY ASPIRIN PO Take 81 mg by mouth daily. Allergies   Allergen Reactions    Cat Dander Other (comments)     eyes swell         REVIEW OF SYSTEMS    Constitutional: Negative for fever, chills, or weight change. Respiratory: Negative for cough or shortness of breath. Cardiovascular: Negative for chest pain or palpitations. Gastrointestinal: Negative for acid reflux, change in bowel habits, or constipation. Genitourinary: Negative for dysuria and flank pain. Musculoskeletal: Positive for lumbar pain. Neurological: Negative for headaches or dizziness. Positive for numbness. Psychiatric/Behavioral: Negative for difficulty with sleep. As per HPI    PHYSICAL EXAMINATION  Visit Vitals  BP (!) 154/78 (BP 1 Location: Left arm, BP Patient Position: Sitting) Comment: pt asymptomatic, MD aware. pt has not taken bp meds yet   Pulse 81   Temp 97.7 °F (36.5 °C) (Skin)   Resp 26   Ht 5' 5\" (1.651 m)   Wt 180 lb (81.6 kg)   SpO2 98% Comment: RA   BMI 29.95 kg/m²       Constitutional: Awake, alert, and in no acute distress. Neurological: 1+ symmetrical DTRs in the upper extremities. 1+ symmetrical DTRs in the lower extremities. Sensation to light touch is intact. Negative Guzmán's sign bilaterally. Skin: warm, dry, and intact. Musculoskeletal: Tenderness to palpation in the lower lumbar region. Moderate pain with extension. No pain with axial loading. Improvement with forward flexion. Tenderness to palpation over the sacroiliac joints. Pain when transitioning from sit to stand. No tenderness to palpation over the greater trochanters. No pain with internal or external rotation of her hips. Negative straight leg raise bilaterally.      Hip Flex  Quads Hamstrings Ankle DF EHL Ankle PF   Right +4/5 +4/5 +4/5 +4/5 +4/5 +4/5   Left +4/5 +4/5 +4/5 +4/5 +4/5 +4/5     IMAGING:    Lumbar spine MRI from 11/11/2020 were personally reviewed with the patient and demonstrated:  FINDINGS:       IMAGE QUALITY:  Diagnostic. SEGMENTAL DESIGNATION:  The numbering scheme used in this dictation is based upon the assumption of 5 lumbar segments. INTRADURAL:  The conus terminates at L1 and is normal. The nerve roots have a normal distribution in the thecal sac without evidence of arachnoiditis. SPINAL CURVATURE (SUPINE):  Trace broad dextroconvex lumbar spinal curvature. Normal lordosis. SPINAL COLUMN AND MUSCULATURE:  The background bone marrow signal is unremarkable. There are no acute (T2 STIR positive) or chronic compression fractures. The posterior paraspinous musculature has low-grade fatty infiltration. L5-S1:            Alignment (supine): Unremarkable. Intervertebral disc:  Unremarkable. Epidural lipomatosis:  None significant. Ligamentum flavum thickening/buckling:  None significant. Central spinal canal stenosis:  None significant. Lateral recess stenosis:                         Right:  None significant. Left:  None significant. Facet joint arthrosis:                         Right:  Moderate. Left:  Low-grade. Neural foraminal stenosis:                   Right:  None significant. Left:  None significant. Baastrup-type interspinous process arthrosis:  None significant. L4-L5:            Alignment (supine):  2 mm L4 anterolisthesis secondary to degenerative facet arthropathy. .            Intervertebral disc: Moderate to marked desiccation. Trace circumferential bulging. Mild height loss. Epidural lipomatosis:  None significant. Ligamentum flavum thickening/buckling:  Low-grade.             Central spinal canal stenosis:  Low-grade. Lateral recess stenosis:                         Right:  High-grade. Potential right L5 nerve root impingement. Left:  High-grade. Potential left L5 nerve root impingement. Facet joint arthrosis:                         Right:  Marked, small effusion, no edema. Left:  Marked, without effusion or edema. Neural foraminal stenosis:                   Right:  None significant. Left:  None significant. Baastrup-type interspinous process arthrosis:  None significant. L3-L4:            Alignment (supine): Unremarkable. Intervertebral disc: Moderate desiccation. Epidural lipomatosis:  None significant. Ligamentum flavum thickening/buckling:  None significant. Central spinal canal stenosis:  None significant. Lateral recess stenosis:                         Right:  None significant. Left:  None significant. Facet joint arthrosis:                         Right:  None significant apparent on MRI. Left:  None significant apparent on MRI. Neural foraminal stenosis:                   Right:  None significant. Left:  None significant. Baastrup-type interspinous process arthrosis:  None significant. L2-L3:            Alignment (supine): Unremarkable. Intervertebral disc: Moderate desiccation. Epidural lipomatosis:  None significant. Ligamentum flavum thickening/buckling:  None significant. Central spinal canal stenosis:  None significant. Lateral recess stenosis:                         Right:  None significant. Left:  None significant.                      Facet joint arthrosis: Right:  None significant apparent on MRI. Left:  None significant apparent on MRI. Neural foraminal stenosis:                   Right:  None significant. Left:  None significant. Baastrup-type interspinous process arthrosis:  None significant. L1-L2:            Alignment (supine): Unremarkable. Intervertebral disc: Moderate desiccation. Epidural lipomatosis:  None significant. Ligamentum flavum thickening/buckling:  None significant. Central spinal canal stenosis:  None significant. Lateral recess stenosis:                         Right:  None significant. Left:  None significant. Facet joint arthrosis:                         Right:  None significant apparent on MRI. Left:  None significant apparent on MRI. Neural foraminal stenosis:                   Right:  None significant. Left:  None significant. Baastrup-type interspinous process arthrosis:  None significant. T12-L1:            Alignment (supine): Unremarkable. Intervertebral disc: Moderate desiccation. Central spinal canal stenosis:  None significant. Neural foraminal stenosis:                   Right:  None significant. Left:  None significant. SACROILIAC JOINTS:  Low grade arthrosis in the partially imaged upper portions. IMPRESSION    1. Advanced degenerative change at L4-L5.          --Marked bilateral degenerative facet arthropathy with mild L4 anterolisthesis. --High-grade bilateral lateral recess stenosis about the descending L5 nerve roots. 2.  Trace scoliosis with asymmetric moderate right L5-S1 degenerative facet arthropathy.       Written by Gattis Merlin, as dictated by Jadyn Carlson MD.  I, Dr. Jadyn Carlson confirm that all documentation is accurate.

## 2020-11-16 NOTE — PROGRESS NOTES
Adis Evans presents today for   Chief Complaint   Patient presents with    Buttocks pain     left       Is someone accompanying this pt? no    Is the patient using any DME equipment during OV? no    Depression Screening:  3 most recent PHQ Screens 10/22/2020   PHQ Not Done Patient Decline       Fall Risk  Fall Risk Assessment, last 12 mths 10/22/2020   Able to walk? Yes   Fall in past 12 months? No         Coordination of Care:  1. Have you been to the ER, urgent care clinic since your last visit? no  Hospitalized since your last visit? no    2. Have you seen or consulted any other health care providers outside of the [address] [place] [address] since your last visit? no Include any pap smears or colon screening.  no

## 2020-11-16 NOTE — LETTER
11/18/20 Patient: Marcial Muñoz YOB: 1950 Date of Visit: 11/16/2020 Seth Cardozo NP 
[address] [facility] [address] [address] [address] VIA Facsimile: [phone] Dear Seth Cardozo NP, Thank you for referring Ms. Nadira Drake to [facility] for evaluation. My notes for this consultation are attached. If you have questions, please do not hesitate to call me. I look forward to following your patient along with you. Sincerely, Rut Renner MD

## 2020-12-08 ENCOUNTER — OFFICE VISIT (OUTPATIENT)
Dept: ORTHOPEDIC SURGERY | Age: 70
End: 2020-12-08
Payer: MEDICARE

## 2020-12-08 VITALS
TEMPERATURE: 97.6 F | HEIGHT: 65 IN | OXYGEN SATURATION: 99 % | BODY MASS INDEX: 29.99 KG/M2 | HEART RATE: 75 BPM | RESPIRATION RATE: 25 BRPM | SYSTOLIC BLOOD PRESSURE: 172 MMHG | WEIGHT: 180 LBS | DIASTOLIC BLOOD PRESSURE: 82 MMHG

## 2020-12-08 DIAGNOSIS — M62.838 MUSCLE SPASM: ICD-10-CM

## 2020-12-08 DIAGNOSIS — R03.0 ELEVATED BLOOD PRESSURE READING: ICD-10-CM

## 2020-12-08 DIAGNOSIS — M54.16 LUMBAR RADICULOPATHY: ICD-10-CM

## 2020-12-08 DIAGNOSIS — M70.62 TROCHANTERIC BURSITIS OF LEFT HIP: Primary | ICD-10-CM

## 2020-12-08 DIAGNOSIS — M17.12 PRIMARY OSTEOARTHRITIS OF LEFT KNEE: ICD-10-CM

## 2020-12-08 DIAGNOSIS — M47.816 LUMBAR FACET ARTHROPATHY: ICD-10-CM

## 2020-12-08 PROCEDURE — 3017F COLORECTAL CA SCREEN DOC REV: CPT | Performed by: PHYSICAL MEDICINE & REHABILITATION

## 2020-12-08 PROCEDURE — 1090F PRES/ABSN URINE INCON ASSESS: CPT | Performed by: PHYSICAL MEDICINE & REHABILITATION

## 2020-12-08 PROCEDURE — G8432 DEP SCR NOT DOC, RNG: HCPCS | Performed by: PHYSICAL MEDICINE & REHABILITATION

## 2020-12-08 PROCEDURE — G8427 DOCREV CUR MEDS BY ELIG CLIN: HCPCS | Performed by: PHYSICAL MEDICINE & REHABILITATION

## 2020-12-08 PROCEDURE — G8419 CALC BMI OUT NRM PARAM NOF/U: HCPCS | Performed by: PHYSICAL MEDICINE & REHABILITATION

## 2020-12-08 PROCEDURE — 1101F PT FALLS ASSESS-DOCD LE1/YR: CPT | Performed by: PHYSICAL MEDICINE & REHABILITATION

## 2020-12-08 PROCEDURE — 99214 OFFICE O/P EST MOD 30 MIN: CPT | Performed by: PHYSICAL MEDICINE & REHABILITATION

## 2020-12-08 PROCEDURE — G8400 PT W/DXA NO RESULTS DOC: HCPCS | Performed by: PHYSICAL MEDICINE & REHABILITATION

## 2020-12-08 PROCEDURE — G8536 NO DOC ELDER MAL SCRN: HCPCS | Performed by: PHYSICAL MEDICINE & REHABILITATION

## 2020-12-08 RX ORDER — CETIRIZINE HCL 10 MG
10 TABLET ORAL
COMMUNITY

## 2020-12-08 RX ORDER — DICLOFENAC SODIUM 10 MG/G
GEL TOPICAL
Qty: 500 G | Refills: 1 | Status: SHIPPED | OUTPATIENT
Start: 2020-12-08 | End: 2021-03-24 | Stop reason: SDUPTHER

## 2020-12-08 RX ORDER — MELOXICAM 7.5 MG/1
TABLET ORAL
Qty: 60 TAB | Refills: 1 | Status: SHIPPED | OUTPATIENT
Start: 2020-12-08 | End: 2021-10-12

## 2020-12-08 NOTE — LETTER
12/11/20 Patient: Colton Gates YOB: 1950 Date of Visit: 12/8/2020 Ketty Peterson NP 
[address] [facility] [address] [address] [address] [address] [address] [address] VIA Facsimile: [phone] Dear Ketty Peterson NP, Thank you for referring Ms. Doron Jeffrey to [address] [place] [address] for evaluation. My notes for this consultation are attached. If you have questions, please do not hesitate to call me. I look forward to following your patient along with you. Sincerely, Julissa Kapadia MD

## 2020-12-08 NOTE — PATIENT INSTRUCTIONS
High Blood Pressure: Care Instructions  Overview     It's normal for blood pressure to go up and down throughout the day. But if it stays up, you have high blood pressure. Another name for high blood pressure is hypertension. Despite what a lot of people think, high blood pressure usually doesn't cause headaches or make you feel dizzy or lightheaded. It usually has no symptoms. But it does increase your risk of stroke, heart attack, and other problems. You and your doctor will talk about your risks of these problems based on your blood pressure. Your doctor will give you a goal for your blood pressure. Your goal will be based on your health and your age. Lifestyle changes, such as eating healthy and being active, are always important to help lower blood pressure. You might also take medicine to reach your blood pressure goal.  Follow-up care is a key part of your treatment and safety. Be sure to make and go to all appointments, and call your doctor if you are having problems. It's also a good idea to know your test results and keep a list of the medicines you take. How can you care for yourself at home? Medical treatment  · If you stop taking your medicine, your blood pressure will go back up. You may take one or more types of medicine to lower your blood pressure. Be safe with medicines. Take your medicine exactly as prescribed. Call your doctor if you think you are having a problem with your medicine. · Talk to your doctor before you start taking aspirin every day. Aspirin can help certain people lower their risk of a heart attack or stroke. But taking aspirin isn't right for everyone, because it can cause serious bleeding. · See your doctor regularly. You may need to see the doctor more often at first or until your blood pressure comes down. · If you are taking blood pressure medicine, talk to your doctor before you take decongestants or anti-inflammatory medicine, such as ibuprofen.  Some of these medicines can raise blood pressure. · Learn how to check your blood pressure at home. Lifestyle changes  · Stay at a healthy weight. This is especially important if you put on weight around the waist. Losing even 10 pounds can help you lower your blood pressure. · If your doctor recommends it, get more exercise. Walking is a good choice. Bit by bit, increase the amount you walk every day. Try for at least 30 minutes on most days of the week. You also may want to swim, bike, or do other activities. · Avoid or limit alcohol. Talk to your doctor about whether you can drink any alcohol. · Try to limit how much sodium you eat to less than 2,300 milligrams (mg) a day. Your doctor may ask you to try to eat less than 1,500 mg a day. · Eat plenty of fruits (such as bananas and oranges), vegetables, legumes, whole grains, and low-fat dairy products. · Lower the amount of saturated fat in your diet. Saturated fat is found in animal products such as milk, cheese, and meat. Limiting these foods may help you lose weight and also lower your risk for heart disease. · Do not smoke. Smoking increases your risk for heart attack and stroke. If you need help quitting, talk to your doctor about stop-smoking programs and medicines. These can increase your chances of quitting for good. When should you call for help? Call  911 anytime you think you may need emergency care. This may mean having symptoms that suggest that your blood pressure is causing a serious heart or blood vessel problem. Your blood pressure may be over 180/120. For example, call 911 if:    · You have symptoms of a heart attack. These may include:  ? Chest pain or pressure, or a strange feeling in the chest.  ? Sweating. ? Shortness of breath. ? Nausea or vomiting. ? Pain, pressure, or a strange feeling in the back, neck, jaw, or upper belly or in one or both shoulders or arms. ? Lightheadedness or sudden weakness.   ? A fast or irregular heartbeat.     · You have symptoms of a stroke. These may include:  ? Sudden numbness, tingling, weakness, or loss of movement in your face, arm, or leg, especially on only one side of your body. ? Sudden vision changes. ? Sudden trouble speaking. ? Sudden confusion or trouble understanding simple statements. ? Sudden problems with walking or balance. ? A sudden, severe headache that is different from past headaches.     · You have severe back or belly pain. Do not wait until your blood pressure comes down on its own. Get help right away. Call your doctor now or seek immediate care if:    · Your blood pressure is much higher than normal (such as 180/120 or higher), but you don't have symptoms.     · You think high blood pressure is causing symptoms, such as:  ? Severe headache.  ? Blurry vision. Watch closely for changes in your health, and be sure to contact your doctor if:    · Your blood pressure measures higher than your doctor recommends at least 2 times. That means the top number is higher or the bottom number is higher, or both.     · You think you may be having side effects from your blood pressure medicine. Where can you learn more? Go to http://www.gray.com/  Enter [id number] in the search box to learn more about \"High Blood Pressure: Care Instructions. \"  Current as of: December 16, 2019               Content Version: 12.6  © 2965-8894 [employer]. Care instructions adapted under license by [employer] (which disclaims liability or warranty for this information). If you have questions about a medical condition or this instruction, always ask your healthcare professional. [place] [address] any warranty or liability for your use of this information. Low Back Arthritis: Exercises  Introduction  Here are some examples of typical rehabilitation exercises for your condition. Start each exercise slowly.  Ease off the exercise if you start to have pain.  Your doctor or physical therapist will tell you when you can start these exercises and which ones will work best for you. When you are not being active, find a comfortable position for rest. Some people are comfortable on the floor or a medium-firm bed with a small pillow under their head and another under their knees. Some people prefer to lie on their side with a pillow between their knees. Don't stay in one position for too long. Take short walks (10 to 20 minutes) every 2 to 3 hours. Avoid slopes, hills, and stairs until you feel better. Walk only distances you can manage without pain, especially leg pain. How to do the exercises  Pelvic tilt   1. Lie on your back with your knees bent. 2. \"Brace\" your stomachtighten your muscles by pulling in and imagining your belly button moving toward your spine. 3. Press your lower back into the floor. You should feel your hips and pelvis rock back. 4. Hold for 6 seconds while breathing smoothly. 5. Relax and allow your pelvis and hips to rock forward. 6. Repeat 8 to 12 times. Back stretches   1. Get down on your hands and knees on the floor. 2. Relax your head and allow it to droop. Round your back up toward the ceiling until you feel a nice stretch in your upper, middle, and lower back. Hold this stretch for as long as it feels comfortable, or about 15 to 30 seconds. 3. Return to the starting position with a flat back while you are on your hands and knees. 4. Let your back sway by pressing your stomach toward the floor. Lift your buttocks toward the ceiling. 5. Hold this position for 15 to 30 seconds. 6. Repeat 2 to 4 times. Follow-up care is a key part of your treatment and safety. Be sure to make and go to all appointments, and call your doctor if you are having problems. It's also a good idea to know your test results and keep a list of the medicines you take. Where can you learn more?   Go to http://www.gray.com/  Enter F525 in the search box to learn more about \"Low Back Arthritis: Exercises. \"  Current as of: March 2, 2020               Content Version: 12.6  © 1765-9742 [employer]. Care instructions adapted under license by [employer] (which disclaims liability or warranty for this information). If you have questions about a medical condition or this instruction, always ask your healthcare professional. [place] [address] any warranty or liability for your use of this information. Hip Bursitis: Exercises  Introduction  Here are some examples of exercises for you to try. The exercises may be suggested for a condition or for rehabilitation. Start each exercise slowly. Ease off the exercises if you start to have pain. You will be told when to start these exercises and which ones will work best for you. How to do the exercises  Hip rotator stretch   1. Lie on your back with both knees bent and your feet flat on the floor. 2. Put the ankle of your affected leg on your opposite thigh near your knee. 3. Use your hand to gently push your knee away from your body until you feel a gentle stretch around your hip. 4. Hold the stretch for 15 to 30 seconds. 5. Repeat 2 to 4 times. 6. Repeat steps 1 through 5, but this time use your hand to gently pull your knee toward your opposite shoulder. Iliotibial band stretch   1. Lean sideways against a wall. If you are not steady on your feet, hold on to a chair or counter. 2. Stand on the leg with the affected hip, with that leg close to the wall. Then cross your other leg in front of it. 3. Let your affected hip drop out to the side of your body and against wall. Then lean away from your affected hip until you feel a stretch. 4. Hold the stretch for 15 to 30 seconds. 5. Repeat 2 to 4 times. Straight-leg raises to the outside   1.  Lie on your side, with your affected hip on top.  2. Tighten the front thigh muscles of your top leg to keep your knee straight. 3. Keep your hip and your leg straight in line with the rest of your body, and keep your knee pointing forward. Do not drop your hip back. 4. Lift your top leg straight up toward the ceiling, about 12 inches off the floor. Hold for about 6 seconds, then slowly lower your leg. 5. Repeat 8 to 12 times. Clamshell   1. Lie on your side, with your affected hip on top and your head propped on a pillow. Keep your feet and knees together and your knees bent. 2. Raise your top knee, but keep your feet together. Do not let your hips roll back. Your legs should open up like a clamshell. 3. Hold for 6 seconds. 4. Slowly lower your knee back down. Rest for 10 seconds. 5. Repeat 8 to 12 times. Follow-up care is a key part of your treatment and safety. Be sure to make and go to all appointments, and call your doctor if you are having problems. It's also a good idea to know your test results and keep a list of the medicines you take. Where can you learn more? Go to http://www.[employer].com/  Enter H674 in the search box to learn more about \"Hip Bursitis: Exercises. \"  Current as of: March 2, 2020               Content Version: 12.6  © 2628-2902 [employer]. Care instructions adapted under license by [employer] (which disclaims liability or warranty for this information). If you have questions about a medical condition or this instruction, always ask your healthcare professional. [place] [address] any warranty or liability for your use of this information.

## 2020-12-08 NOTE — PROGRESS NOTES
[facility]  [address], [facility] [address]  Phone: [phone]  Fax: [phone]    Pt's YOB: 1950    ASSESSMENT   Diagnoses and all orders for this visit:    1. Trochanteric bursitis of left hip  -     meloxicam (Mobic) 7.5 mg tablet; Take 1 tab by mouth two (2) times daily (with meals) as needed for pain. 2. Primary osteoarthritis of left knee  -     diclofenac (Voltaren) 1 % gel; Apply 4 grams to left knee up to 4 times per day, maximum 16 grams per joint per day. Dispense 5 100 gram tubes    3. Muscle spasm    4. Lumbar facet arthropathy    5. Elevated blood pressure reading    6. Lumbar radiculopathy         IMPRESSION AND PLAN:  Brendan Cheek is a 78 y.o. female with history of lumbar pain. She complains of pain in the lower back when standing, particularly when washing dishes. Pt also reports sharp pain in the left buttock that occasionally radiates down the left leg to the knee. She discontinued Topamax 25 mg 3 tabs QHS since it was not effective and takes Celebrex 200 mg as needed. 1) Pt was given information on lumbar arthritis and hip bursitis exercises. 2) She will discontinue Celebrex 200 mg and was prescribed Mobic 7.5 mg 1 tab BID prn pain with food-- she will also carefully monitor BP. 3) Pt received a refill of Voltaren 1% gel. 4) Ms. Darden has a reminder for a \"due or due soon\" health maintenance. I have asked that she contact her primary care provider, Kailyn Brewster NP, for follow-up on this health maintenance. 5)  demonstrated consistency with prescribing. 6) Injection for bursitis also discussed - will try medication first  Follow-up and Dispositions    · Return in about 6 weeks (around 1/19/2021) for Medication follow up. HISTORY OF PRESENT ILLNESS:  Brendan Cheek is a 78 y.o. female with history of lumbar pain and presents to the office today for follow up.  She complains of pain in the lower back when standing, particularly when washing dishes. Her lower back pain generally improves when she takes a break to sit down. Pt also reports sharp pain in the left buttock that occasionally radiates down the left leg to the knee. She reports relief in her left leg pain when she lays in her couch and hangs her legs over the edge. Pt reports severe left leg pain when transitioning from sit to stand, particularly in the morning, but she denies any pain with ambulation. She started Topamax 25 mg 3 tabs QHS but then discontinued the medication since it was not effective. Pt did not tolerate Neurontin. She takes Celebrex 200 mg as needed. Pt notes that her sister has been taking Mobic with significant relief and she wishes to try Mobic at this time. She takes hydrocodone rarely as her pain warrants and notes that she took her last tab 3 days ago to help with sleep. Pt has previously used Voltaren 1% gel with relief. Of note, she has a history of arthritis in both knees and had a right knee replacement. Pt at this time desires to proceed with medication evaluation.     Pain Scale: 9/10    PCP: Braydon Ferrell NP     Past Medical History:   Diagnosis Date    Arthritis     Asthma     As a child    Diabetes ([facility] [address]     High cholesterol     Hypertension     Numbness of left hand         Social History     Socioeconomic History    Marital status:      Spouse name: Not on file    Number of children: Not on file    Years of education: Not on file    Highest education level: Not on file   Occupational History    Occupation: office work     Comment: at [employer] Financial resource strain: Not on file    Food insecurity     Worry: Not on file     Inability: Not on file   [place] [facility] needs     Medical: Not on file     Non-medical: Not on file   Tobacco Use    Smoking status: Never Smoker    Smokeless tobacco: Never Used   Substance and Sexual Activity    Alcohol use: No    Drug use: No    Sexual activity: Not on file   Lifestyle    Physical activity     Days per week: Not on file     Minutes per session: Not on file    Stress: Not on file   Relationships    Social connections     Talks on phone: Not on file     Gets together: Not on file     Attends Orthodox service: Not on file     Active member of club or organization: Not on file     Attends meetings of clubs or organizations: Not on file     Relationship status: Not on file    Intimate partner violence     Fear of current or ex partner: Not on file     Emotionally abused: Not on file     Physically abused: Not on file     Forced sexual activity: Not on file   Other Topics Concern    Not on file   Social History Narrative    Not on file       Current Outpatient Medications   Medication Sig Dispense Refill    cetirizine (ZyrTEC) 10 mg tablet Take 10 mg by mouth daily as needed for Allergies.  meloxicam (Mobic) 7.5 mg tablet Take 1 tab by mouth two (2) times daily (with meals) as needed for pain. 60 Tab 1    diclofenac (Voltaren) 1 % gel Apply 4 grams to left knee up to 4 times per day, maximum 16 grams per joint per day. Dispense 5 100 gram tubes 500 g 1    glipiZIDE SR (GLUCOTROL XL) 5 mg CR tablet TAKE 1 TABLET DAILY      losartan-hydroCHLOROthiazide (HYZAAR) 50-12.5 mg per tablet Take 1 Tab by mouth daily. 1    atorvastatin (LIPITOR) 20 mg tablet Take 20 mg by mouth daily.  topiramate (TOPAMAX) 25 mg tablet Take 1 in the evening for 1 week, then increase to 2 the second week and continue with 3 in the evening 90 Tab 1    celecoxib (CeleBREX) 200 mg capsule Take 1 Cap by mouth two (2) times daily (with meals). 60 Cap 1    gabapentin (Neurontin) 100 mg capsule Take 1 cap by mouth at night for 1 week, then increase to 2 as directed. 60 Cap 2    metFORMIN ER (GLUCOPHAGE XR) 500 mg tablet   6    fenofibrate nanocrystallized (TRICOR) 145 mg tablet Take 145 mg by mouth daily.       HYDROcodone-acetaminophen (NORCO) 7.5-325 mg per tablet Take 1 Tab by mouth every eight (8) hours as needed for Pain. Max Daily Amount: 3 Tabs. 60 Tab 0    HYDROcodone-acetaminophen (VICODIN ES) 7.5-300 mg tablet Take 1 Tab by mouth four (4) times daily as needed. Max Daily Amount: 4 Tabs. 30 Tab 0    LORATADINE PO Take 10 mg by mouth daily as needed.  metFORMIN (GLUCOPHAGE) 500 mg tablet Take  by mouth two (2) times daily (with meals).  FENOFIBRATE PO Take 145 mg by mouth daily.  BABY ASPIRIN PO Take 81 mg by mouth daily. Allergies   Allergen Reactions    Cat Dander Other (comments)     eyes swell         REVIEW OF SYSTEMS    Constitutional: Negative for fever, chills, or weight change. Respiratory: Negative for cough or shortness of breath. Cardiovascular: Negative for chest pain or palpitations. Gastrointestinal: Negative for acid reflux, change in bowel habits, or constipation. Genitourinary: Negative for dysuria and flank pain. Musculoskeletal: Positive for lumbar pain  Neurological: Negative for headaches, dizziness, or numbness. Psychiatric/Behavioral: Positive for difficulty with sleep. As per HPI    PHYSICAL EXAMINATION  Visit Vitals  BP (!) 172/82   Pulse 75   Temp 97.6 °F (36.4 °C) (Skin)   Resp 25   Ht 5' 5\" (1.651 m)   Wt 180 lb (81.6 kg)   SpO2 99% Comment: RA   BMI 29.95 kg/m²       Constitutional: Awake, alert, and in no acute distress. Neurological: 1+ symmetrical DTRs in the upper extremities. 1+ symmetrical DTRs in the lower extremities. Sensation to light touch is intact. Negative Guzmán's sign bilaterally. Skin: warm, dry, and intact. Musculoskeletal: Tenderness to palpation in the lower lumbar region, L>R. Tenderness to palpation over the left greater trochanter. Moderate pain with extension and axial loading. No pain with internal or external rotation of her hips. Negative straight leg raise bilaterally.      Hip Flex  Quads Hamstrings Ankle DF EHL Ankle PF   Right +4/5 +4/5 +4/5 +4/5 +4/5 +4/5   Left +4/5 +4/5 +4/5 +4/5 +4/5 +4/5     IMAGING:    Lumbar spine MRI from 11/11/2020 were personally reviewed with the patient and demonstrated:  FINDINGS:       IMAGE QUALITY:  Diagnostic. SEGMENTAL DESIGNATION:  The numbering scheme used in this dictation is based upon the assumption of 5 lumbar segments. INTRADURAL:  The conus terminates at L1 and is normal. The nerve roots have a normal distribution in the thecal sac without evidence of arachnoiditis. SPINAL CURVATURE (SUPINE):  Trace broad dextroconvex lumbar spinal curvature. Normal lordosis. SPINAL COLUMN AND MUSCULATURE:  The background bone marrow signal is unremarkable. There are no acute (T2 STIR positive) or chronic compression fractures. The posterior paraspinous musculature has low-grade fatty infiltration. L5-S1:            Alignment (supine): Unremarkable. Intervertebral disc:  Unremarkable. Epidural lipomatosis:  None significant. Ligamentum flavum thickening/buckling:  None significant. Central spinal canal stenosis:  None significant. Lateral recess stenosis:                         Right:  None significant. Left:  None significant. Facet joint arthrosis:                         Right:  Moderate. Left:  Low-grade. Neural foraminal stenosis:                   Right:  None significant. Left:  None significant. Baastrup-type interspinous process arthrosis:  None significant. L4-L5:            Alignment (supine):  2 mm L4 anterolisthesis secondary to degenerative facet arthropathy. .            Intervertebral disc: Moderate to marked desiccation. Trace circumferential bulging. Mild height loss. Epidural lipomatosis:  None significant.             Ligamentum flavum thickening/buckling:  Low-grade. Central spinal canal stenosis:  Low-grade. Lateral recess stenosis:                         Right:  High-grade. Potential right L5 nerve root impingement. Left:  High-grade. Potential left L5 nerve root impingement. Facet joint arthrosis:                         Right:  Marked, small effusion, no edema. Left:  Marked, without effusion or edema. Neural foraminal stenosis:                   Right:  None significant. Left:  None significant. Baastrup-type interspinous process arthrosis:  None significant. L3-L4:            Alignment (supine): Unremarkable. Intervertebral disc: Moderate desiccation. Epidural lipomatosis:  None significant. Ligamentum flavum thickening/buckling:  None significant. Central spinal canal stenosis:  None significant. Lateral recess stenosis:                         Right:  None significant. Left:  None significant. Facet joint arthrosis:                         Right:  None significant apparent on MRI. Left:  None significant apparent on MRI. Neural foraminal stenosis:                   Right:  None significant. Left:  None significant. Baastrup-type interspinous process arthrosis:  None significant. L2-L3:            Alignment (supine): Unremarkable. Intervertebral disc: Moderate desiccation. Epidural lipomatosis:  None significant. Ligamentum flavum thickening/buckling:  None significant. Central spinal canal stenosis:  None significant. Lateral recess stenosis:                         Right:  None significant.                            Left:  None significant. Facet joint arthrosis:                         Right:  None significant apparent on MRI. Left:  None significant apparent on MRI. Neural foraminal stenosis:                   Right:  None significant. Left:  None significant. Baastrup-type interspinous process arthrosis:  None significant. L1-L2:            Alignment (supine): Unremarkable. Intervertebral disc: Moderate desiccation. Epidural lipomatosis:  None significant. Ligamentum flavum thickening/buckling:  None significant. Central spinal canal stenosis:  None significant. Lateral recess stenosis:                         Right:  None significant. Left:  None significant. Facet joint arthrosis:                         Right:  None significant apparent on MRI. Left:  None significant apparent on MRI. Neural foraminal stenosis:                   Right:  None significant. Left:  None significant. Baastrup-type interspinous process arthrosis:  None significant. T12-L1:            Alignment (supine): Unremarkable. Intervertebral disc: Moderate desiccation. Central spinal canal stenosis:  None significant. Neural foraminal stenosis:                   Right:  None significant. Left:  None significant. SACROILIAC JOINTS:  Low grade arthrosis in the partially imaged upper portions. IMPRESSION    1. Advanced degenerative change at L4-L5.          --Marked bilateral degenerative facet arthropathy with mild L4 anterolisthesis. --High-grade bilateral lateral recess stenosis about the descending L5 nerve roots.     2.  Trace scoliosis with asymmetric moderate right L5-S1 degenerative facet arthropathy. Written by Jordan Mccloud, as dictated by Lata Mandel MD.  I, Dr. Lata Mandel confirm that all documentation is accurate.

## 2020-12-08 NOTE — PROGRESS NOTES
Shavonne Blunt presents today for   Chief Complaint   Patient presents with    Hip Pain     left       Is someone accompanying this pt? no    Is the patient using any DME equipment during OV? no    Depression Screening:  3 most recent PHQ Screens 10/22/2020   PHQ Not Done Patient Decline       Fall Risk  Fall Risk Assessment, last 12 mths 10/22/2020   Able to walk? Yes   Fall in past 12 months? No       Coordination of Care:  1. Have you been to the ER, urgent care clinic since your last visit? no  Hospitalized since your last visit? no    2. Have you seen or consulted any other health care providers outside of the [address] [place] [address] since your last visit? no Include any pap smears or colon screening.  no

## 2021-03-24 ENCOUNTER — OFFICE VISIT (OUTPATIENT)
Dept: ORTHOPEDIC SURGERY | Age: 71
End: 2021-03-24
Payer: MEDICARE

## 2021-03-24 VITALS
SYSTOLIC BLOOD PRESSURE: 177 MMHG | RESPIRATION RATE: 22 BRPM | DIASTOLIC BLOOD PRESSURE: 84 MMHG | WEIGHT: 181 LBS | BODY MASS INDEX: 30.16 KG/M2 | HEIGHT: 65 IN | TEMPERATURE: 97.8 F | HEART RATE: 81 BPM

## 2021-03-24 DIAGNOSIS — M54.16 LUMBAR RADICULOPATHY: ICD-10-CM

## 2021-03-24 DIAGNOSIS — M47.816 LUMBAR FACET ARTHROPATHY: ICD-10-CM

## 2021-03-24 DIAGNOSIS — M70.62 TROCHANTERIC BURSITIS OF LEFT HIP: Primary | ICD-10-CM

## 2021-03-24 DIAGNOSIS — M62.838 MUSCLE SPASM: ICD-10-CM

## 2021-03-24 DIAGNOSIS — M17.12 PRIMARY OSTEOARTHRITIS OF LEFT KNEE: ICD-10-CM

## 2021-03-24 DIAGNOSIS — R03.0 ELEVATED BLOOD PRESSURE READING: ICD-10-CM

## 2021-03-24 PROCEDURE — G8400 PT W/DXA NO RESULTS DOC: HCPCS | Performed by: PHYSICAL MEDICINE & REHABILITATION

## 2021-03-24 PROCEDURE — G8417 CALC BMI ABV UP PARAM F/U: HCPCS | Performed by: PHYSICAL MEDICINE & REHABILITATION

## 2021-03-24 PROCEDURE — 3017F COLORECTAL CA SCREEN DOC REV: CPT | Performed by: PHYSICAL MEDICINE & REHABILITATION

## 2021-03-24 PROCEDURE — 99214 OFFICE O/P EST MOD 30 MIN: CPT | Performed by: PHYSICAL MEDICINE & REHABILITATION

## 2021-03-24 PROCEDURE — 1090F PRES/ABSN URINE INCON ASSESS: CPT | Performed by: PHYSICAL MEDICINE & REHABILITATION

## 2021-03-24 PROCEDURE — 1101F PT FALLS ASSESS-DOCD LE1/YR: CPT | Performed by: PHYSICAL MEDICINE & REHABILITATION

## 2021-03-24 PROCEDURE — G8432 DEP SCR NOT DOC, RNG: HCPCS | Performed by: PHYSICAL MEDICINE & REHABILITATION

## 2021-03-24 PROCEDURE — G8536 NO DOC ELDER MAL SCRN: HCPCS | Performed by: PHYSICAL MEDICINE & REHABILITATION

## 2021-03-24 PROCEDURE — G8427 DOCREV CUR MEDS BY ELIG CLIN: HCPCS | Performed by: PHYSICAL MEDICINE & REHABILITATION

## 2021-03-24 RX ORDER — DICLOFENAC SODIUM 10 MG/G
GEL TOPICAL
Qty: 500 G | Refills: 1 | Status: SHIPPED | OUTPATIENT
Start: 2021-03-24 | End: 2021-10-12 | Stop reason: SDUPTHER

## 2021-03-24 RX ORDER — ERGOCALCIFEROL 1.25 MG/1
CAPSULE ORAL
COMMUNITY
Start: 2021-03-08

## 2021-03-24 NOTE — PROGRESS NOTES
[facility]  [address] [place], [address]  Phone: [phone]  Fax: [phone]    Pt's YOB: 1950    ASSESSMENT   Diagnoses and all orders for this visit:    1. Trochanteric bursitis of left hip    2. Primary osteoarthritis of left knee  -     diclofenac (Voltaren) 1 % gel; Apply 4 grams to left knee up to 4 times per day, maximum 16 grams per joint per day. Dispense 5 100 gram tubes    3. Muscle spasm    4. Lumbar facet arthropathy    5. Elevated blood pressure reading    6. Lumbar radiculopathy    7. BMI 30.0-30.9,adult         IMPRESSION AND PLAN:  Sam Hunter is a 78 y.o. female with history of lumbar pain. Pt complains of pain in the lower back that radiates down the right leg. She notes pain when getting out of bed and when standing for extended periods of time, particularly when cooking. Pt uses Voltaren 1% gel on her left hip and knee as needed with relief. 1) Pt was given information on lumbar arthritis and trochanteric bursitis exercises. 2) She received a refill of Voltaren 1% gel. 3) Pt may restart Neurontin 100 mg as tolerated-- may take earlier in the evening to decrease morning side effects if present; trial of Lyrica also discussed. 4) Ms. Darden has a reminder for a \"due or due soon\" health maintenance. I have asked that she contact her primary care provider, Randolph Donald NP, for follow-up on this health maintenance and for evaluation of her blood pressure. 5)  demonstrated consistency with prescribing  6) Pt offered a trochanteric bursitis injection but she declined  7) HEP and use of heat/ice also recommended  8) Weight loss encouraged   Follow-up and Dispositions    · Return in about 3 months (around 6/24/2021) for Medication follow up. HISTORY OF PRESENT ILLNESS:  Sam Hunter is a 78 y.o. female with history of lumbar pain and presents to the office today for follow up. Pt complains of pain in the lower back that radiates down the left leg. She notes pain when getting out of bed and when standing for extended periods of time, particularly when cooking. Pt notes dizziness when taking Neurontin 100 mg 1 cap QHS but has considered restarting the medication. She reports no relief when taking Topamax 25 mg 3 tabs QHS. Pt is unable to recall if she has ever taken Lyrica. She denies taking Flexeril but states that her  experiences significant relief with the medication. Pt uses Voltaren 1% gel on her left hip and knee as needed with relief. She denies any relief when taking Mobic 7.5 mg so discontinued the medication. Pt presents to the office today hypertensive (177/84) and notes that she was instructed to take her blood pressure medication every other day since she was experiencing hypotensive episodes. She admits to diffuse weakness when her systolic blood pressure drops to the 80's-90's. Pt at this time desires to continue with current care.     Pain Scale: 7/10    PCP: Shayy Levi NP     Past Medical History:   Diagnosis Date    Arthritis     Asthma     As a child    Diabetes ([facility] [address]     High cholesterol     Hypertension     Numbness of left hand         Social History     Socioeconomic History    Marital status:      Spouse name: Not on file    Number of children: Not on file    Years of education: Not on file    Highest education level: Not on file   Occupational History    Occupation: office work     Comment: at [employer] Financial resource strain: Not on file    Food insecurity     Worry: Not on file     Inability: Not on file   Modafirma needs     Medical: Not on file     Non-medical: Not on file   Tobacco Use    Smoking status: Never Smoker    Smokeless tobacco: Never Used   Substance and Sexual Activity    Alcohol use: No    Drug use: No    Sexual activity: Not on file   Lifestyle    Physical activity     Days per week: Not on file     Minutes per session: Not on file    Stress: Not on file   Relationships    Social connections     Talks on phone: Not on file     Gets together: Not on file     Attends Restorationism service: Not on file     Active member of club or organization: Not on file     Attends meetings of clubs or organizations: Not on file     Relationship status: Not on file    Intimate partner violence     Fear of current or ex partner: Not on file     Emotionally abused: Not on file     Physically abused: Not on file     Forced sexual activity: Not on file   Other Topics Concern    Not on file   Social History Narrative    Not on file       Current Outpatient Medications   Medication Sig Dispense Refill    ergocalciferol (ERGOCALCIFEROL) 1,250 mcg (50,000 unit) capsule       diclofenac (Voltaren) 1 % gel Apply 4 grams to left knee up to 4 times per day, maximum 16 grams per joint per day. Dispense 5 100 gram tubes 500 g 1    cetirizine (ZyrTEC) 10 mg tablet Take 10 mg by mouth daily as needed for Allergies.  glipiZIDE SR (GLUCOTROL XL) 5 mg CR tablet TAKE 1 TABLET DAILY      fenofibrate nanocrystallized (TRICOR) 145 mg tablet Take 145 mg by mouth daily.  losartan-hydroCHLOROthiazide (HYZAAR) 50-12.5 mg per tablet Take 1 Tab by mouth daily. 1    atorvastatin (LIPITOR) 20 mg tablet Take 20 mg by mouth daily.  meloxicam (Mobic) 7.5 mg tablet Take 1 tab by mouth two (2) times daily (with meals) as needed for pain. 60 Tab 1    topiramate (TOPAMAX) 25 mg tablet Take 1 in the evening for 1 week, then increase to 2 the second week and continue with 3 in the evening 90 Tab 1    celecoxib (CeleBREX) 200 mg capsule Take 1 Cap by mouth two (2) times daily (with meals). 60 Cap 1    gabapentin (Neurontin) 100 mg capsule Take 1 cap by mouth at night for 1 week, then increase to 2 as directed.  60 Cap 2    metFORMIN ER (GLUCOPHAGE XR) 500 mg tablet   6    HYDROcodone-acetaminophen (NORCO) 7.5-325 mg per tablet Take 1 Tab by mouth every eight (8) hours as needed for Pain. Max Daily Amount: 3 Tabs. 60 Tab 0    HYDROcodone-acetaminophen (VICODIN ES) 7.5-300 mg tablet Take 1 Tab by mouth four (4) times daily as needed. Max Daily Amount: 4 Tabs. 30 Tab 0    LORATADINE PO Take 10 mg by mouth daily as needed.  metFORMIN (GLUCOPHAGE) 500 mg tablet Take  by mouth two (2) times daily (with meals).  FENOFIBRATE PO Take 145 mg by mouth daily.  BABY ASPIRIN PO Take 81 mg by mouth daily. Allergies   Allergen Reactions    Nitrate Analogues Other (comments)     EYES SWOLLEN AND RED    Cat Dander Other (comments)     eyes swell    Lisinopril Itching     \"cough and itching\"         REVIEW OF SYSTEMS    Constitutional: Negative for fever, chills, or weight change. Respiratory: Negative for cough or shortness of breath. Cardiovascular: Negative for chest pain or palpitations. Gastrointestinal: Negative for acid reflux, change in bowel habits, or constipation. Genitourinary: Negative for dysuria and flank pain. Musculoskeletal: Positive for lumbar, left hip, and left knee pain. Neurological: Negative for headaches, dizziness, or numbness. Endo/Heme/Allergies: Negative for increased bruising. Psychiatric/Behavioral: Positive for difficulty with sleep. As per HPI    PHYSICAL EXAMINATION  Visit Vitals  BP (!) 177/84 (BP 1 Location: Left upper arm, BP Patient Position: Sitting, BP Cuff Size: Large adult) Comment: pt took meds will recheck again   Pulse 81   Temp 97.8 °F (36.6 °C) (Temporal)   Resp 22   Ht 5' 5\" (1.651 m)   Wt 181 lb (82.1 kg)   BMI 30.12 kg/m²       Constitutional: Awake, alert, and in no acute distress. Neurological: 1+ symmetrical DTRs in the upper extremities. 1+ symmetrical DTRs in the lower extremities. Sensation to light touch is intact. Negative Guzmán's sign bilaterally. Skin: warm, dry, and intact.    Musculoskeletal: Tenderness to palpation over the lower lumbar region. Moderate pain with extension and axial loading. Tenderness to palpation over the left greater trochanter. No pain with internal or external rotation of her hips. Negative straight leg raise bilaterally. Hip Flex  Quads Hamstrings Ankle DF EHL Ankle PF   Right +4/5 +4/5 +4/5 +4/5 +4/5 +4/5   Left +4/5 +4/5 +4/5 +4/5 +4/5 +4/5     IMAGING:    Lumbar spine MRI from 11/11/2020 were personally reviewed with the patient and demonstrated:  FINDINGS:       IMAGE QUALITY:  Diagnostic.       SEGMENTAL DESIGNATION:  The numbering scheme used in this dictation is based upon the assumption of 5 lumbar segments.       INTRADURAL:  The conus terminates at L1 and is normal. The nerve roots have a normal distribution in the thecal sac without evidence of arachnoiditis.       SPINAL CURVATURE (SUPINE):  Trace broad dextroconvex lumbar spinal curvature.  Normal lordosis.    SPINAL COLUMN AND MUSCULATURE:  The background bone marrow signal is unremarkable.  There are no acute (T2 STIR positive) or chronic compression fractures.  The posterior paraspinous musculature has low-grade fatty infiltration.          L5-S1:            Alignment (supine):  Unremarkable.            Intervertebral disc:  Unremarkable.            Epidural lipomatosis:  None significant.            Ligamentum flavum thickening/buckling:  None significant.            Central spinal canal stenosis:  None significant.            Lateral recess stenosis:                         Right:  None significant.                           Left:  None significant.                     Facet joint arthrosis:                         Right:  Moderate.                          Left:  Low-grade.              Neural foraminal stenosis:                   Right:  None significant.                           Left:  None significant.                     Baastrup-type interspinous process arthrosis:  None significant.          L4-L5:            Alignment (supine):  2 mm L4 anterolisthesis secondary to degenerative facet arthropathy. .            Intervertebral disc:  Moderate to marked desiccation.  Trace circumferential bulging.  Mild height loss.            Epidural lipomatosis:  None significant.            Ligamentum flavum thickening/buckling:  Low-grade.            Central spinal canal stenosis:  Low-grade.            Lateral recess stenosis:                         Right:  High-grade. Potential right L5 nerve root impingement.                           Left:  High-grade.  Potential left L5 nerve root impingement.                     Facet joint arthrosis:                         Right:  Marked, small effusion, no edema.                          Left:  Marked, without effusion or edema.              Neural foraminal stenosis:                   Right:  None significant.                           Left:  None significant.                     Baastrup-type interspinous process arthrosis:  None significant.          L3-L4:            Alignment (supine):  Unremarkable.            Intervertebral disc:  Moderate desiccation.            Epidural lipomatosis:  None significant.            Ligamentum flavum thickening/buckling:  None significant.            Central spinal canal stenosis:  None significant.            Lateral recess stenosis:                         Right:  None significant.                           Left:  None significant.                     Facet joint arthrosis:                         Right:  None significant apparent on MRI.                          Left:  None significant apparent on MRI.              Neural foraminal stenosis:                   Right:  None significant.                           Left:  None significant.                     Baastrup-type interspinous process arthrosis:  None significant.          L2-L3:            Alignment (supine):  Unremarkable.            Intervertebral disc:  Moderate desiccation.            Epidural lipomatosis:  None significant.            Ligamentum flavum thickening/buckling:  None significant.            Central spinal canal stenosis:  None significant.            Lateral recess stenosis:                         Right:  None significant.                           Left:  None significant.                     Facet joint arthrosis:                         Right:  None significant apparent on MRI.                          Left:  None significant apparent on MRI.              Neural foraminal stenosis:                   Right:  None significant.                           Left:  None significant.                     Baastrup-type interspinous process arthrosis:  None significant.          L1-L2:            Alignment (supine):  Unremarkable.            Intervertebral disc:  Moderate desiccation.            Epidural lipomatosis:  None significant.            Ligamentum flavum thickening/buckling:  None significant.            Central spinal canal stenosis:  None significant.            Lateral recess stenosis:                         Right:  None significant.                           Left:  None significant.                     Facet joint arthrosis:                         Right:  None significant apparent on MRI.                          Left:  None significant apparent on MRI.              Neural foraminal stenosis:                   Right:  None significant.                           Left:  None significant.                     Baastrup-type interspinous process arthrosis:  None significant.          T12-L1:            Alignment (supine):  Unremarkable.            Intervertebral disc:  Moderate desiccation.            Central spinal canal stenosis:  None significant.               Neural foraminal stenosis:                   Right:  None significant.                           Left:  None significant.        SACROILIAC JOINTS:  Low grade arthrosis in the partially imaged upper portions. IMPRESSION    1.  Advanced degenerative change at L4-L5.          --Marked bilateral degenerative facet arthropathy with mild L4 anterolisthesis.         --High-grade bilateral lateral recess stenosis about the descending L5 nerve roots. 2.  Trace scoliosis with asymmetric moderate right L5-S1 degenerative facet arthropathy. Written by Jorden Bishop, as dictated by Elma Rene MD.  I, Dr. Elma Rene confirm that all documentation is accurate.

## 2021-03-24 NOTE — PATIENT INSTRUCTIONS
Low Back Arthritis: Exercises Introduction Here are some examples of typical rehabilitation exercises for your condition. Start each exercise slowly. Ease off the exercise if you start to have pain. Your doctor or physical therapist will tell you when you can start these exercises and which ones will work best for you. When you are not being active, find a comfortable position for rest. Some people are comfortable on the floor or a medium-firm bed with a small pillow under their head and another under their knees. Some people prefer to lie on their side with a pillow between their knees. Don't stay in one position for too long. Take short walks (10 to 20 minutes) every 2 to 3 hours. Avoid slopes, hills, and stairs until you feel better. Walk only distances you can manage without pain, especially leg pain. How to do the exercises Pelvic tilt 1. Lie on your back with your knees bent. 2. \"Brace\" your stomachtighten your muscles by pulling in and imagining your belly button moving toward your spine. 3. Press your lower back into the floor. You should feel your hips and pelvis rock back. 4. Hold for 6 seconds while breathing smoothly. 5. Relax and allow your pelvis and hips to rock forward. 6. Repeat 8 to 12 times. Back stretches 1. Get down on your hands and knees on the floor. 2. Relax your head and allow it to droop. Round your back up toward the ceiling until you feel a nice stretch in your upper, middle, and lower back. Hold this stretch for as long as it feels comfortable, or about 15 to 30 seconds. 3. Return to the starting position with a flat back while you are on your hands and knees. 4. Let your back sway by pressing your stomach toward the floor. Lift your buttocks toward the ceiling. 5. Hold this position for 15 to 30 seconds. 6. Repeat 2 to 4 times. Follow-up care is a key part of your treatment and safety.  Be sure to make and go to all appointments, and call your doctor if you are having problems. It's also a good idea to know your test results and keep a list of the medicines you take. Where can you learn more? Go to http://www.gray.com/ Enter F255 in the search box to learn more about \"Low Back Arthritis: Exercises. \" Current as of: March 2, 2020               Content Version: 12.6 © 2006-2020 [employer]. Care instructions adapted under license by [employer] (which disclaims liability or warranty for this information). If you have questions about a medical condition or this instruction, always ask your healthcare professional. [place] [address] any warranty or liability for your use of this information. Trochanteric Bursitis: Exercises Introduction Here are some examples of exercises for you to try. The exercises may be suggested for a condition or for rehabilitation. Start each exercise slowly. Ease off the exercises if you start to have pain. You will be told when to start these exercises and which ones will work best for you. How to do the exercises Hamstring wall stretch 1. Lie on your back in a doorway, with your good leg through the open door. 2. Slide your affected leg up the wall to straighten your knee. You should feel a gentle stretch down the back of your leg. 3. Hold the stretch for at least 1 minute to begin. Then try to lengthen the time you hold the stretch to as long as 6 minutes. 4. Repeat 2 to 4 times. 5. If you do not have a place to do this exercise in a doorway, there is another way to do it: 6. Lie on your back, and bend the knee of your affected leg. 7. Loop a towel under the ball and toes of that foot, and hold the ends of the towel in your hands. 8. Straighten your knee, and slowly pull back on the towel. You should feel a gentle stretch down the back of your leg. 9. Hold the stretch for 15 to 30 seconds.  Or even better, hold the stretch for 1 minute if you can. 
10. Repeat 2 to 4 times. 1. Do not arch your back. 2. Do not bend either knee. 3. Keep one heel touching the floor and the other heel touching the wall. Do not point your toes. Straight-leg raises to the outside 1. Lie on your side, with your affected leg on top. 2. Tighten the front thigh muscles of your top leg to keep your knee straight. 3. Keep your hip and your leg straight in line with the rest of your body, and keep your knee pointing forward. Do not drop your hip back. 4. Lift your top leg straight up toward the ceiling, about 12 inches off the floor. Hold for about 6 seconds, then slowly lower your leg. 5. Repeat 8 to 12 times. Clamshell 1. Lie on your side, with your affected leg on top and your head propped on a pillow. Keep your feet and knees together and your knees bent. 2. Raise your top knee, but keep your feet together. Do not let your hips roll back. Your legs should open up like a clamshell. 3. Hold for 6 seconds. 4. Slowly lower your knee back down. Rest for 10 seconds. 5. Repeat 8 to 12 times. Standing quadriceps stretch 1. If you are not steady on your feet, hold on to a chair, counter, or wall. You can also lie on your stomach or your side to do this exercise. 2. Bend the knee of the leg you want to stretch, and reach behind you to grab the front of your foot or ankle with the hand on the same side. For example, if you are stretching your right leg, use your right hand. 3. Keeping your knees next to each other, pull your foot toward your buttock until you feel a gentle stretch across the front of your hip and down the front of your thigh. Your knee should be pointed directly to the ground, and not out to the side. 4. Hold the stretch for 15 to 30 seconds. 5. Repeat 2 to 4 times. Piriformis stretch 1. Lie on your back with your legs straight. 2. Lift your affected leg and bend your knee.  With your opposite hand, reach across your body, and then gently pull your knee toward your opposite shoulder. 3. Hold the stretch for 15 to 30 seconds. 4. Repeat 2 to 4 times. Double knee-to-chest  
1. Lie on your back with your knees bent and your feet flat on the floor. You can put a small pillow under your head and neck if it is more comfortable. 2. Bring both knees to your chest. 
3. Keep your lower back pressed to the floor. Hold for 15 to 30 seconds. 4. Relax, and lower your knees to the starting position. 5. Repeat 2 to 4 times. Follow-up care is a key part of your treatment and safety. Be sure to make and go to all appointments, and call your doctor if you are having problems. It's also a good idea to know your test results and keep a list of the medicines you take. Where can you learn more? Go to http://gladys-duy.info/ Enter X542 in the search box to learn more about \"Trochanteric Bursitis: Exercises. \" Current as of: March 2, 2020               Content Version: 12.6 © 2006-2020 [employer]. Care instructions adapted under license by [employer] (which disclaims liability or warranty for this information). If you have questions about a medical condition or this instruction, always ask your healthcare professional. [address] any warranty or liability for your use of this information.

## 2021-03-24 NOTE — LETTER
3/26/2021 Patient: Laila Villarreal YOB: 1950 Date of Visit: 3/24/2021 Raji Odell NP 
[address] [facility] [place] [phone] [address] Fax: [phone] Dear Raji Odell NP, Thank you for referring Ms. Maddison Casas to [address] [place] [address] for evaluation. My notes for this consultation are attached. If you have questions, please do not hesitate to call me. I look forward to following your patient along with you. Sincerely, Oliver Jasso MD

## 2021-03-24 NOTE — PROGRESS NOTES
03/24/21 Aicha Solano is a 78 y.o. female is here for   Chief Complaint   Patient presents with    Follow-up    Back Pain   . Vitals:    03/24/21 0932 03/24/21 0948   BP: (!) 189/89 (!) 177/84   Pulse: 82 81   Resp: 22    Temp: 97.8 °F (36.6 °C)    TempSrc: Temporal    Weight: 181 lb (82.1 kg)    Height: 5' 5\" (1.651 m)    PainSc:   7    PainLoc: Hip     Body mass index is 30.12 kg/m². Chart reviewed including medical/surgical/family history. Allergies   Allergen Reactions    Nitrate Analogues Other (comments)     EYES SWOLLEN AND RED    Cat Dander Other (comments)     eyes swell    Lisinopril Itching     \"cough and itching\"    has a current medication list which includes the following prescription(s): ergocalciferol, cetirizine, diclofenac, glipizide sr, fenofibrate nanocrystallized, losartan-hydrochlorothiazide, atorvastatin, meloxicam, topiramate, celecoxib, gabapentin, metformin er, hydrocodone-acetaminophen, hydrocodone-acetaminophen, loratadine, metformin, fenofibrate, and aspirin. Social History     Tobacco Use    Smoking status: Never Smoker    Smokeless tobacco: Never Used   Substance Use Topics    Alcohol use: No    Drug use: No    No Doesnt Have     Is someone accompanying this pt? no    Is the patient using any DME equipment during OV? no    Pain Assessment:  Pain Assessment  3/24/2021   Location of Pain Hip   Pain Location Comment -   Location Modifiers Left   Severity of Pain 7   Quality of Pain Sharp; Other (Comment)   Quality of Pain Comment cutting, numbness   Duration of Pain A few hours   Frequency of Pain Intermittent   Aggravating Factors Bending;Squatting; Walking;Stairs; Other (Comment); Standing   Aggravating Factors Comment anything   Limiting Behavior -   Relieving Factors -   Relieving Factors Comment -   Result of Injury -       Depression Screening:  3 most recent PHQ Screens 10/22/2020   PHQ Not Done Patient Decline       Learning Assessment:  No flowsheet data found.    Abuse Screening:  No flowsheet data found. Fall Risk  Fall Risk Assessment, last 12 mths 10/22/2020   Able to walk? Yes   Fall in past 12 months? No       OPIOID RISK TOOL  No flowsheet data found. Pt currently taking Antiplatelet therapy? Yes 81mg ASA    Coordination of Care:  1. Have you been to the ER, urgent care clinic since your last visit? no  Hospitalized since your last visit? no    2. Have you seen or consulted any other health care providers outside of the [address] [place] [address] since your last visit? no Include any pap smears or colon screening.  no

## 2021-10-12 ENCOUNTER — OFFICE VISIT (OUTPATIENT)
Dept: ORTHOPEDIC SURGERY | Age: 71
End: 2021-10-12
Payer: MEDICARE

## 2021-10-12 VITALS
RESPIRATION RATE: 18 BRPM | HEART RATE: 80 BPM | WEIGHT: 169 LBS | HEIGHT: 65 IN | DIASTOLIC BLOOD PRESSURE: 90 MMHG | OXYGEN SATURATION: 98 % | BODY MASS INDEX: 28.16 KG/M2 | SYSTOLIC BLOOD PRESSURE: 145 MMHG | TEMPERATURE: 97.5 F

## 2021-10-12 DIAGNOSIS — Z86.73 HISTORY OF TIA (TRANSIENT ISCHEMIC ATTACK): ICD-10-CM

## 2021-10-12 DIAGNOSIS — G62.9 NEUROPATHY: ICD-10-CM

## 2021-10-12 DIAGNOSIS — M48.061 LUMBAR FORAMINAL STENOSIS: ICD-10-CM

## 2021-10-12 DIAGNOSIS — M47.816 LUMBAR FACET ARTHROPATHY: ICD-10-CM

## 2021-10-12 DIAGNOSIS — M17.12 PRIMARY OSTEOARTHRITIS OF LEFT KNEE: Primary | ICD-10-CM

## 2021-10-12 DIAGNOSIS — M62.838 MUSCLE SPASM: ICD-10-CM

## 2021-10-12 DIAGNOSIS — R03.0 ELEVATED BLOOD PRESSURE READING: ICD-10-CM

## 2021-10-12 PROCEDURE — G8510 SCR DEP NEG, NO PLAN REQD: HCPCS | Performed by: PHYSICAL MEDICINE & REHABILITATION

## 2021-10-12 PROCEDURE — G8536 NO DOC ELDER MAL SCRN: HCPCS | Performed by: PHYSICAL MEDICINE & REHABILITATION

## 2021-10-12 PROCEDURE — 1101F PT FALLS ASSESS-DOCD LE1/YR: CPT | Performed by: PHYSICAL MEDICINE & REHABILITATION

## 2021-10-12 PROCEDURE — 1090F PRES/ABSN URINE INCON ASSESS: CPT | Performed by: PHYSICAL MEDICINE & REHABILITATION

## 2021-10-12 PROCEDURE — G8427 DOCREV CUR MEDS BY ELIG CLIN: HCPCS | Performed by: PHYSICAL MEDICINE & REHABILITATION

## 2021-10-12 PROCEDURE — G8417 CALC BMI ABV UP PARAM F/U: HCPCS | Performed by: PHYSICAL MEDICINE & REHABILITATION

## 2021-10-12 PROCEDURE — G8400 PT W/DXA NO RESULTS DOC: HCPCS | Performed by: PHYSICAL MEDICINE & REHABILITATION

## 2021-10-12 PROCEDURE — 99213 OFFICE O/P EST LOW 20 MIN: CPT | Performed by: PHYSICAL MEDICINE & REHABILITATION

## 2021-10-12 PROCEDURE — 3017F COLORECTAL CA SCREEN DOC REV: CPT | Performed by: PHYSICAL MEDICINE & REHABILITATION

## 2021-10-12 RX ORDER — DICLOFENAC SODIUM 10 MG/G
GEL TOPICAL
Qty: 500 G | Refills: 3 | Status: SHIPPED | OUTPATIENT
Start: 2021-10-12

## 2021-10-12 NOTE — PATIENT INSTRUCTIONS
Low Back Arthritis: Exercises  Introduction  Here are some examples of typical rehabilitation exercises for your condition. Start each exercise slowly. Ease off the exercise if you start to have pain. Your doctor or physical therapist will tell you when you can start these exercises and which ones will work best for you. When you are not being active, find a comfortable position for rest. Some people are comfortable on the floor or a medium-firm bed with a small pillow under their head and another under their knees. Some people prefer to lie on their side with a pillow between their knees. Don't stay in one position for too long. Take short walks (10 to 20 minutes) every 2 to 3 hours. Avoid slopes, hills, and stairs until you feel better. Walk only distances you can manage without pain, especially leg pain. How to do the exercises  Pelvic tilt    1. Lie on your back with your knees bent. 2. \"Brace\" your stomachtighten your muscles by pulling in and imagining your belly button moving toward your spine. 3. Press your lower back into the floor. You should feel your hips and pelvis rock back. 4. Hold for 6 seconds while breathing smoothly. 5. Relax and allow your pelvis and hips to rock forward. 6. Repeat 8 to 12 times. Back stretches    1. Get down on your hands and knees on the floor. 2. Relax your head and allow it to droop. Round your back up toward the ceiling until you feel a nice stretch in your upper, middle, and lower back. Hold this stretch for as long as it feels comfortable, or about 15 to 30 seconds. 3. Return to the starting position with a flat back while you are on your hands and knees. 4. Let your back sway by pressing your stomach toward the floor. Lift your buttocks toward the ceiling. 5. Hold this position for 15 to 30 seconds. 6. Repeat 2 to 4 times. Follow-up care is a key part of your treatment and safety.  Be sure to make and go to all appointments, and call your doctor if you are having problems. It's also a good idea to know your test results and keep a list of the medicines you take. Where can you learn more? Go to http://www.[employer].com/  Enter T094 in the search box to learn more about \"Low Back Arthritis: Exercises. \"  Current as of: July 1, 2021               Content Version: 13.0  © 1024-0614 [employer]. Care instructions adapted under license by [employer] (which disclaims liability or warranty for this information). If you have questions about a medical condition or this instruction, always ask your healthcare professional. [place] [address] any warranty or liability for your use of this information.

## 2021-10-12 NOTE — PROGRESS NOTES
[facility]  [address], [facility] [address]  Phone: [phone]  Fax: [phone]    Pt's YOB: 1950    ASSESSMENT   Diagnoses and all orders for this visit:    1. Primary osteoarthritis of left knee  -     diclofenac (Voltaren) 1 % gel; Apply 4 grams to left knee up to 4 times per day, maximum 16 grams per joint per day. Dispense 5 100 gram tubes    2. Muscle spasm    3. Lumbar facet arthropathy    4. Lumbar foraminal stenosis    5. Elevated blood pressure reading    6. History of TIA (transient ischemic attack)    7. Neuropathy         IMPRESSION AND PLAN:  Chanel Baird is a 73 y.o. female with history of lumbar pain. Pt reports continued pain in the lower back and left hip. She uses Voltaren gel as needed and requests a refill at this time. 1) Pt was given information on lumbar arthritis exercises. 2) She received a refill of Voltaren 1% gel. 3) I recommended the patient try nandini chi and chair yoga. 4) Ms. Darden has a reminder for a \"due or due soon\" health maintenance. I have asked that she contact her primary care provider, Montrell Martinez NP, for follow-up on this health maintenance and for her blood pressure  5)  demonstrated consistency with prescribing. 6) Pt encouraged to monitor her blood pressure regularly   7) Continued weight loss and consistent HEP recommended  8) Pt is no longer taking Topamax or Neurontin at this time  Follow-up and Dispositions    · Return in about 6 months (around 4/12/2022) for Medication follow up. HISTORY OF PRESENT ILLNESS:  Chanel Baird is a 73 y.o. female with history of lumbar pain and presents to the office today for follow up. She notes that she is doing well at this time. Pt reports continued pain in the lower back and left hip. She uses Voltaren gel as needed and requests a refill at this time.  Pt admits to a decrease in appetite and weight loss since her last office visit. She reports that she followed up with Dr. Joey Roldan for memory issues and was told she had another TIA (her 6th one). Pt is currently on aspirin 81 mg. She has a history of diabetes mellitus and manages her blood sugars with glipizide. Pt at this time desires to continue with current care. She plans on retiring in 12/2022 and will have 40 years of service. Pain Scale: 0 - No pain/10    PCP: Brittany Stafford NP     Past Medical History:   Diagnosis Date    Arthritis     Asthma     As a child    Diabetes ([address]     High cholesterol     Hypertension     Numbness of left hand         Social History     Socioeconomic History    Marital status:      Spouse name: Not on file    Number of children: Not on file    Years of education: Not on file    Highest education level: Not on file   Occupational History    Occupation: office work     Comment: [address] Use    Smoking status: Never Smoker    Smokeless tobacco: Never Used   Substance and Sexual Activity    Alcohol use: No    Drug use: No    Sexual activity: Not on file   Other Topics Concern    Not on file   Social History Narrative    Not on file     Social Determinants of Health     Financial Resource Strain:     Difficulty of Paying Living Expenses:    Food Insecurity:     Worried About [address] [employer] in the Last Year:     [address] in the Last Year:    Transportation Needs:     Lack of Transportation (Medical):      Lack of Transportation (Non-Medical):    Physical Activity:     Days of Exercise per Week:     Minutes of Exercise per Session:    Stress:     Feeling of Stress :    Social Connections:     Frequency of Communication with Friends and Family:     Frequency of Social Gatherings with Friends and Family:     Attends Bahai Services:     Active Member of Clubs or Organizations:     Attends Club or Organization Meetings:     Marital Status:    Intimate Partner Violence:  Fear of Current or Ex-Partner:     Emotionally Abused:     Physically Abused:     Sexually Abused:        Current Outpatient Medications   Medication Sig Dispense Refill    diclofenac (Voltaren) 1 % gel Apply 4 grams to left knee up to 4 times per day, maximum 16 grams per joint per day. Dispense 5 100 gram tubes 500 g 3    ergocalciferol (ERGOCALCIFEROL) 1,250 mcg (50,000 unit) capsule       cetirizine (ZyrTEC) 10 mg tablet Take 10 mg by mouth daily as needed for Allergies.  glipiZIDE SR (GLUCOTROL XL) 5 mg CR tablet TAKE 1 TABLET DAILY      losartan-hydroCHLOROthiazide (HYZAAR) 50-12.5 mg per tablet Take 1 Tab by mouth daily. 1    atorvastatin (LIPITOR) 20 mg tablet Take 20 mg by mouth daily.  BABY ASPIRIN PO Take 81 mg by mouth daily. Allergies   Allergen Reactions    Nitrate Analogues Other (comments)     EYES SWOLLEN AND RED    Cat Dander Other (comments)     eyes swell    Lisinopril Itching     \"cough and itching\"         REVIEW OF SYSTEMS    Constitutional: Negative for fever or chills. Positive for 11 lb weight loss. Respiratory: Negative for cough or shortness of breath. Cardiovascular: Negative for chest pain or palpitations. Gastrointestinal: Negative for acid reflux, change in bowel habits, or constipation. Genitourinary: Negative for dysuria and flank pain. Musculoskeletal: Positive for lumbar pain. Skin: Negative for rash. Neurological: Negative for headaches, dizziness, or numbness; positive for memory issues. Endo/Heme/Allergies: Negative for increased bruising. Psychiatric/Behavioral: Negative for difficulty with sleep. As per HPI    PHYSICAL EXAMINATION  Visit Vitals  BP (!) 145/90 (BP 1 Location: Right arm)   Pulse 80   Temp 97.5 °F (36.4 °C) (Temporal)   Resp 18   Ht 5' 5\" (1.651 m)   Wt 169 lb (76.7 kg)   SpO2 98%   BMI 28.12 kg/m²       Constitutional: Awake, alert, and in no acute distress.   Neurological: Sensation to light touch is intact. Skin: warm, dry, and intact. Musculoskeletal: No tenderness to palpation in the lumbar region or over the greater trochanters. No pain with extension, axial loading, or forward flexion. No pain with internal or external rotation of her hips. Negative straight leg raise bilaterally. Hip Flex  Quads Hamstrings Ankle DF EHL Ankle PF   Right +4/5 +4/5 +4/5 +4/5 +4/5 +4/5   Left +4/5 +4/5 +4/5 +4/5 +4/5 +4/5     IMAGING:    Lumbar spine MRI from 11/11/2020 were personally reviewed with the patient and demonstrated:  FINDINGS:       IMAGE QUALITY:  Diagnostic.       SEGMENTAL DESIGNATION:  The numbering scheme used in this dictation is based upon the assumption of 5 lumbar segments.       INTRADURAL:  The conus terminates at L1 and is normal. The nerve roots have a normal distribution in the thecal sac without evidence of arachnoiditis.       SPINAL CURVATURE (SUPINE):  Trace broad dextroconvex lumbar spinal curvature.  Normal lordosis.    SPINAL COLUMN AND MUSCULATURE:  The background bone marrow signal is unremarkable.  There are no acute (T2 STIR positive) or chronic compression fractures.  The posterior paraspinous musculature has low-grade fatty infiltration.          L5-S1:            Alignment (supine):  Unremarkable.            Intervertebral disc:  Unremarkable.            Epidural lipomatosis:  None significant.            Ligamentum flavum thickening/buckling:  None significant.            Central spinal canal stenosis:  None significant.            Lateral recess stenosis:                         Right:  None significant.                           Left:  None significant.                     Facet joint arthrosis:                         Right:  Moderate.                          Left:  Low-grade.              Neural foraminal stenosis:                   Right:  None significant.                           Left:  None significant.                     Baastrup-type interspinous process arthrosis:  None significant.          L4-L5:            Alignment (supine):  2 mm L4 anterolisthesis secondary to degenerative facet arthropathy. .            Intervertebral disc:  Moderate to marked desiccation.  Trace circumferential bulging.  Mild height loss.            Epidural lipomatosis:  None significant.            Ligamentum flavum thickening/buckling:  Low-grade.            Central spinal canal stenosis:  Low-grade.            Lateral recess stenosis:                         Right:  High-grade. Potential right L5 nerve root impingement.                           Left:  High-grade.  Potential left L5 nerve root impingement.                     Facet joint arthrosis:                         Right:  Marked, small effusion, no edema.                          Left:  Marked, without effusion or edema.              Neural foraminal stenosis:                   Right:  None significant.                           Left:  None significant.                     Baastrup-type interspinous process arthrosis:  None significant.          L3-L4:            Alignment (supine):  Unremarkable.            Intervertebral disc:  Moderate desiccation.            Epidural lipomatosis:  None significant.            Ligamentum flavum thickening/buckling:  None significant.            Central spinal canal stenosis:  None significant.            Lateral recess stenosis:                         Right:  None significant.                           Left:  None significant.                     Facet joint arthrosis:                         Right:  None significant apparent on MRI.                          Left:  None significant apparent on MRI.              Neural foraminal stenosis:                   Right:  None significant.                           Left:  None significant.                     Baastrup-type interspinous process arthrosis:  None significant.          L2-L3:            Alignment (supine):  Unremarkable.            Intervertebral disc:  Moderate desiccation.            Epidural lipomatosis:  None significant.            Ligamentum flavum thickening/buckling:  None significant.            Central spinal canal stenosis:  None significant.            Lateral recess stenosis:                         Right:  None significant.                           Left:  None significant.                     Facet joint arthrosis:                         Right:  None significant apparent on MRI.                          Left:  None significant apparent on MRI.              Neural foraminal stenosis:                   Right:  None significant.                           Left:  None significant.                     Baastrup-type interspinous process arthrosis:  None significant.          L1-L2:            Alignment (supine):  Unremarkable.            Intervertebral disc:  Moderate desiccation.            Epidural lipomatosis:  None significant.            Ligamentum flavum thickening/buckling:  None significant.            Central spinal canal stenosis:  None significant.            Lateral recess stenosis:                         Right:  None significant.                           Left:  None significant.                     Facet joint arthrosis:                         Right:  None significant apparent on MRI.                          Left:  None significant apparent on MRI.              Neural foraminal stenosis:                   Right:  None significant.                           Left:  None significant.                     Baastrup-type interspinous process arthrosis:  None significant.          T12-L1:            Alignment (supine):  Unremarkable.            Intervertebral disc:  Moderate desiccation.            Central spinal canal stenosis:  None significant.               Neural foraminal stenosis:                   Right:  None significant.                           Left:  None significant.        SACROILIAC JOINTS:  Low grade arthrosis in the partially imaged upper portions. IMPRESSION    1. Advanced degenerative change at L4-L5.          --Marked bilateral degenerative facet arthropathy with mild L4 anterolisthesis.         --High-grade bilateral lateral recess stenosis about the descending L5 nerve roots. 2.  Trace scoliosis with asymmetric moderate right L5-S1 degenerative facet arthropathy. MRI Head without contrast from 06/24/2021 was reviewed and demonstrated:    IMPRESSION:    1.  Small elongated acute infarct involving the posterior left frontal white matter. No associated hemorrhage or mass effect. 2. Mild to moderate nonspecific white matter disease likely representing chronic small vessel changes. 3. Chronic bilateral periventricular white matter infarcts and additional chronic bilateral basal ganglia infarcts. 4. Left cerebellar hemisphere 5 mm lesion with prominent susceptibility artifact. This lesion may represent a small cavernous hemangioma. No associated edema or mass effect is seen. 5. Additional small punctate foci susceptibility artifact involving bilateral parietal lobes and the right cerebellar hemisphere. These are nonspecific but may represent hemosiderin staining from chronic microhemorrhage, often hypertensive in etiology. Given findings of impression #4, additional small cavernous hemangiomata are possible.         Written by Mateo Gee, as dictated by Lucita Reddy MD.  I, Dr. Lucita Reddy confirm that all documentation is accurate.

## 2021-10-12 NOTE — LETTER
10/14/2021    Patient: Willy Pelaez   YOB: 1950   Date of Visit: 10/12/2021     Alejandra Castellanos NP  [address] [facility]  [address] [facility]  Via Fax: [phone]    Dear Alejandra Castellanos NP,      Thank you for referring Ms. Chapin Mathews to [address] [place] [address] for evaluation. My notes for this consultation are attached. If you have questions, please do not hesitate to call me. I look forward to following your patient along with you.       Sincerely,    Jessica Cheung MD

## 2022-04-12 ENCOUNTER — OFFICE VISIT (OUTPATIENT)
Dept: ORTHOPEDIC SURGERY | Age: 72
End: 2022-04-12
Payer: MEDICARE

## 2022-04-12 VITALS
WEIGHT: 161.4 LBS | TEMPERATURE: 97.8 F | OXYGEN SATURATION: 100 % | BODY MASS INDEX: 26.89 KG/M2 | HEART RATE: 76 BPM | HEIGHT: 65 IN

## 2022-04-12 DIAGNOSIS — G62.9 NEUROPATHY: ICD-10-CM

## 2022-04-12 DIAGNOSIS — M48.061 LUMBAR FORAMINAL STENOSIS: ICD-10-CM

## 2022-04-12 DIAGNOSIS — M17.12 PRIMARY OSTEOARTHRITIS OF LEFT KNEE: Primary | ICD-10-CM

## 2022-04-12 DIAGNOSIS — M47.816 LUMBAR FACET ARTHROPATHY: ICD-10-CM

## 2022-04-12 DIAGNOSIS — M62.838 MUSCLE SPASM: ICD-10-CM

## 2022-04-12 PROCEDURE — 3017F COLORECTAL CA SCREEN DOC REV: CPT | Performed by: PHYSICAL MEDICINE & REHABILITATION

## 2022-04-12 PROCEDURE — G8419 CALC BMI OUT NRM PARAM NOF/U: HCPCS | Performed by: PHYSICAL MEDICINE & REHABILITATION

## 2022-04-12 PROCEDURE — 1101F PT FALLS ASSESS-DOCD LE1/YR: CPT | Performed by: PHYSICAL MEDICINE & REHABILITATION

## 2022-04-12 PROCEDURE — G8400 PT W/DXA NO RESULTS DOC: HCPCS | Performed by: PHYSICAL MEDICINE & REHABILITATION

## 2022-04-12 PROCEDURE — 99213 OFFICE O/P EST LOW 20 MIN: CPT | Performed by: PHYSICAL MEDICINE & REHABILITATION

## 2022-04-12 PROCEDURE — G8536 NO DOC ELDER MAL SCRN: HCPCS | Performed by: PHYSICAL MEDICINE & REHABILITATION

## 2022-04-12 PROCEDURE — G8427 DOCREV CUR MEDS BY ELIG CLIN: HCPCS | Performed by: PHYSICAL MEDICINE & REHABILITATION

## 2022-04-12 PROCEDURE — 1090F PRES/ABSN URINE INCON ASSESS: CPT | Performed by: PHYSICAL MEDICINE & REHABILITATION

## 2022-04-12 PROCEDURE — G8432 DEP SCR NOT DOC, RNG: HCPCS | Performed by: PHYSICAL MEDICINE & REHABILITATION

## 2022-04-12 RX ORDER — PREGABALIN 50 MG/1
CAPSULE ORAL
Qty: 60 CAPSULE | Refills: 1 | Status: SHIPPED | OUTPATIENT
Start: 2022-04-12

## 2022-04-12 NOTE — PROGRESS NOTES
[facility]  [address] [place], [facility] [address]  Phone: [phone]  Fax: [phone]    Pt's YOB: 1950    ASSESSMENT   Diagnoses and all orders for this visit:    1. Primary osteoarthritis of left knee    2. Neuropathy  -     pregabalin (Lyrica) 50 mg capsule; Take 1 cap in the evening for 1 week then increase to 2    3. Muscle spasm    4. Lumbar facet arthropathy    5. Lumbar foraminal stenosis         IMPRESSION AND PLAN:  Kaitlyn Leblanc is a 73 y.o. female with history of lumbar pain. She complains of constant numbness and tingling in the bilateral toes. Pt is not currently taking any medications for neuropathic symptoms and uses Voltaren gel as needed. 1) Pt was given information on lumbar, hip, and knee arthritis exercises. 2) Pt was advised that neuropathy could be a harbinger of diabetes mellitus and advised to wear shoes indoors for foot protection. 3) Discussed medication options for neuropathic pain with the pt. 4) Pt was prescribed Lyrica 50 mg - 2 caps QHS, tapering up as directed. 5) Pt will continue to use Voltaren gel as needed and does not require a refill at this time. 6) Ms. Darden has a reminder for a \"due or due soon\" health maintenance. I have asked that she contact her primary care provider, Erendira Jimenez NP, for follow-up on this health maintenance. 7)  demonstrated consistency with prescribing. Follow-up and Dispositions    · Return in about 6 weeks (around 5/24/2022) for Medication follow up. HISTORY OF PRESENT ILLNESS:  Kaitlyn Leblanc is a 73 y.o. female with history of lumbar pain and presents to the office today for medication follow up. Pt complains of constant numbness and tingling in the bilateral toes. She states that her hip and knee pain are well managed at this time.  Pt admits that her neuropathy is somewhat bothersome and she would be willing to take medication for it but she manages it by keeping her shoes off. She states that she has lost 20 lb since beginning her weight loss efforts. Pt is not taking any medications for neuropathic symptoms and uses Voltaren gel as needed. Pt at this time desires to proceed with medication evaluation. Of note, pt works in a [address] home and will be retiring in 01/2023. She states that in 09/2022 she will have 40 years of service. Pt states she has received 1 dose of the [employer] COVID-19 vaccine. She also notes that her  recently underwent prostate surgery. Pain Scale: 0 - No pain/10    PCP: Lina Rutherford NP     Past Medical History:   Diagnosis Date    Arthritis     Asthma     As a child    Diabetes ([facility] [address]     High cholesterol     Hypertension     Numbness of left hand         Social History     Socioeconomic History    Marital status:      Spouse name: Not on file    Number of children: Not on file    Years of education: Not on file    Highest education level: Not on file   Occupational History    Occupation: office work     Comment: at [address] Use    Smoking status: Never Smoker    Smokeless tobacco: Never Used   Substance and Sexual Activity    Alcohol use: No    Drug use: No    Sexual activity: Not on file   Other Topics Concern    Not on file   Social History Narrative    Not on file     Social Determinants of Health     Financial Resource Strain:     Difficulty of Paying Living Expenses: Not on file   Food Insecurity:     Worried About [address] in the Last Year: Not on file    [address] in the Last Year: Not on file   Transportation Needs:     Lack of Transportation (Medical): Not on file    Lack of Transportation (Non-Medical):  Not on file   Physical Activity:     Days of Exercise per Week: Not on file    Minutes of Exercise per Session: Not on file   Stress:     Feeling of Stress : Not on file   Social Connections:     Frequency of Communication with Friends and Family: Not on file    Frequency of Social Gatherings with Friends and Family: Not on file    Attends Islam Services: Not on file    Active Member of Clubs or Organizations: Not on file    Attends Club or Organization Meetings: Not on file    Marital Status: Not on file   Intimate Partner Violence:     Fear of Current or Ex-Partner: Not on file    Emotionally Abused: Not on file    Physically Abused: Not on file    Sexually Abused: Not on file   Housing Stability:     Unable to Pay for Housing in the Last Year: Not on file    Number of [place] in the Last Year: Not on file    Unstable Housing in the Last Year: Not on file       Current Outpatient Medications   Medication Sig Dispense Refill    pregabalin (Lyrica) 50 mg capsule Take 1 cap in the evening for 1 week then increase to 2 60 Capsule 1    diclofenac (Voltaren) 1 % gel Apply 4 grams to left knee up to 4 times per day, maximum 16 grams per joint per day. Dispense 5 100 gram tubes 500 g 3    ergocalciferol (ERGOCALCIFEROL) 1,250 mcg (50,000 unit) capsule       glipiZIDE SR (GLUCOTROL XL) 5 mg CR tablet TAKE 1 TABLET DAILY      losartan-hydroCHLOROthiazide (HYZAAR) 50-12.5 mg per tablet Take 1 Tab by mouth daily. 1    atorvastatin (LIPITOR) 20 mg tablet Take 20 mg by mouth daily.  BABY ASPIRIN PO Take 81 mg by mouth daily.  cetirizine (ZyrTEC) 10 mg tablet Take 10 mg by mouth daily as needed for Allergies. (Patient not taking: Reported on 4/12/2022)         Allergies   Allergen Reactions    Loteprednol Etabonate Unknown (comments)     The pt could not open her eyes.       Nitrate Analogues Other (comments)     EYES SWOLLEN AND RED    Cat Dander Other (comments)     eyes swell    Dexamethasone Unknown (comments)    Lisinopril Itching     \"cough and itching\"    Neomycin Unknown (comments)    Polymyxin B Sulfate Unknown (comments)         REVIEW OF SYSTEMS    Constitutional: Negative for fever or chills. Positive for intentional weight loss (8 lb since pt's last office visit). Respiratory: Negative for cough or shortness of breath. Cardiovascular: Negative for chest pain or palpitations. Gastrointestinal: Negative for acid reflux, change in bowel habits, or constipation. Genitourinary: Negative for dysuria and flank pain. Musculoskeletal: Positive for left hip and knee pain. Skin: Negative for rash. Neurological: Negative for headaches or dizziness. Positive for bilateral lower extremity 1st-5th digit numbness and tingling. Endo/Heme/Allergies: Negative for increased bruising. Psychiatric/Behavioral: Negative for difficulty with sleep. As per HPI    PHYSICAL EXAMINATION  Visit Vitals  Pulse 76   Temp 97.8 °F (36.6 °C) (Tympanic)   Ht 5' 5\" (1.651 m)   Wt 161 lb 6.4 oz (73.2 kg)   SpO2 100%   BMI 26.86 kg/m²       Constitutional: Awake, alert, and in no acute distress. Neurological: Sensation to light touch is intact. Skin: warm, dry, and intact. Musculoskeletal: No pain with extension, axial loading, or forward flexion. No pain with internal or external rotation of her hips. Negative straight leg raise bilaterally. Hip Flex  Quads Hamstrings Ankle DF EHL Ankle PF   Right +4/5 +4/5 +4/5 +4/5 +4/5 +4/5   Left +4/5 +4/5 +4/5 +4/5 +4/5 +4/5     IMAGING:    Lumbar spine MRI from 11/11/2020 were personally reviewed with the patient and demonstrated:  FINDINGS:       IMAGE QUALITY:  Diagnostic.       SEGMENTAL DESIGNATION:  The numbering scheme used in this dictation is based upon the assumption of 5 lumbar segments.       INTRADURAL:  The conus terminates at L1 and is normal. The nerve roots have a normal distribution in the thecal sac without evidence of arachnoiditis.       SPINAL CURVATURE (SUPINE):  Trace broad dextroconvex lumbar spinal curvature.  Normal lordosis.    SPINAL COLUMN AND MUSCULATURE:  The background bone marrow signal is unremarkable.  There are no acute (T2 STIR positive) or chronic compression fractures.  The posterior paraspinous musculature has low-grade fatty infiltration.          L5-S1:            Alignment (supine):  Unremarkable.            Intervertebral disc:  Unremarkable.            Epidural lipomatosis:  None significant.            Ligamentum flavum thickening/buckling:  None significant.            Central spinal canal stenosis:  None significant.            Lateral recess stenosis:                         Right:  None significant.                           Left:  None significant.                     Facet joint arthrosis:                         Right:  Moderate.                          Left:  Low-grade.              Neural foraminal stenosis:                   Right:  None significant.                           Left:  None significant.                     Baastrup-type interspinous process arthrosis:  None significant.          L4-L5:            Alignment (supine):  2 mm L4 anterolisthesis secondary to degenerative facet arthropathy. .            Intervertebral disc:  Moderate to marked desiccation.  Trace circumferential bulging.  Mild height loss.            Epidural lipomatosis:  None significant.            Ligamentum flavum thickening/buckling:  Low-grade.            Central spinal canal stenosis:  Low-grade.            Lateral recess stenosis:                         Right:  High-grade. Potential right L5 nerve root impingement.                           Left:  High-grade.  Potential left L5 nerve root impingement.                     Facet joint arthrosis:                         Right:  Marked, small effusion, no edema.                          Left:  Marked, without effusion or edema.              Neural foraminal stenosis:                   Right:  None significant.                           Left:  None significant.                     Baastrup-type interspinous process arthrosis:  None significant.          L3-L4:            Alignment (supine):  Unremarkable.            Intervertebral disc:  Moderate desiccation.            Epidural lipomatosis:  None significant.            Ligamentum flavum thickening/buckling:  None significant.            Central spinal canal stenosis:  None significant.            Lateral recess stenosis:                         Right:  None significant.                           Left:  None significant.                     Facet joint arthrosis:                         Right:  None significant apparent on MRI.                          Left:  None significant apparent on MRI.              Neural foraminal stenosis:                   Right:  None significant.                           Left:  None significant.                     Baastrup-type interspinous process arthrosis:  None significant.          L2-L3:            Alignment (supine):  Unremarkable.            Intervertebral disc:  Moderate desiccation.            Epidural lipomatosis:  None significant.            Ligamentum flavum thickening/buckling:  None significant.            Central spinal canal stenosis:  None significant.            Lateral recess stenosis:                         Right:  None significant.                           Left:  None significant.                     Facet joint arthrosis:                         Right:  None significant apparent on MRI.                          Left:  None significant apparent on MRI.              Neural foraminal stenosis:                   Right:  None significant.                           Left:  None significant.                     Baastrup-type interspinous process arthrosis:  None significant.          L1-L2:            Alignment (supine):  Unremarkable.            Intervertebral disc:  Moderate desiccation.            Epidural lipomatosis:  None significant.            Ligamentum flavum thickening/buckling:  None significant.            Central spinal canal stenosis:  None significant.            Lateral recess stenosis:                         Right:  None significant.                           Left:  None significant.                     Facet joint arthrosis:                         Right:  None significant apparent on MRI.                          Left:  None significant apparent on MRI.              Neural foraminal stenosis:                   Right:  None significant.                           Left:  None significant.                     Baastrup-type interspinous process arthrosis:  None significant.          T12-L1:            Alignment (supine):  Unremarkable.            Intervertebral disc:  Moderate desiccation.            Central spinal canal stenosis:  None significant.               Neural foraminal stenosis:                   Right:  None significant.                           Left:  None significant.        SACROILIAC JOINTS:  Low grade arthrosis in the partially imaged upper portions. IMPRESSION    1. Advanced degenerative change at L4-L5.          --Marked bilateral degenerative facet arthropathy with mild L4 anterolisthesis.         --High-grade bilateral lateral recess stenosis about the descending L5 nerve roots. 2.  Trace scoliosis with asymmetric moderate right L5-S1 degenerative facet arthropathy. Written by Zachariah Webster, as dictated by Rona Noriega MD.  I, Dr. Rona Noriega confirm that all documentation is accurate.

## 2022-04-12 NOTE — PATIENT INSTRUCTIONS
Low Back Arthritis: Exercises  Introduction  Here are some examples of typical rehabilitation exercises for your condition. Start each exercise slowly. Ease off the exercise if you start to have pain. Your doctor or physical therapist will tell you when you can start these exercises and which ones will work best for you. When you are not being active, find a comfortable position for rest. Some people are comfortable on the floor or a medium-firm bed with a small pillow under their head and another under their knees. Some people prefer to lie on their side with a pillow between their knees. Don't stay in one position for too long. Take short walks (10 to 20 minutes) every 2 to 3 hours. Avoid slopes, hills, and stairs until you feel better. Walk only distances you can manage without pain, especially leg pain. How to do the exercises  Pelvic tilt    1. Lie on your back with your knees bent. 2. \"Brace\" your stomach--tighten your muscles by pulling in and imagining your belly button moving toward your spine. 3. Press your lower back into the floor. You should feel your hips and pelvis rock back. 4. Hold for 6 seconds while breathing smoothly. 5. Relax and allow your pelvis and hips to rock forward. 6. Repeat 8 to 12 times. Back stretches    1. Get down on your hands and knees on the floor. 2. Relax your head and allow it to droop. Round your back up toward the ceiling until you feel a nice stretch in your upper, middle, and lower back. Hold this stretch for as long as it feels comfortable, or about 15 to 30 seconds. 3. Return to the starting position with a flat back while you are on your hands and knees. 4. Let your back sway by pressing your stomach toward the floor. Lift your buttocks toward the ceiling. 5. Hold this position for 15 to 30 seconds. 6. Repeat 2 to 4 times. Follow-up care is a key part of your treatment and safety.  Be sure to make and go to all appointments, and call your doctor if you are having problems. It's also a good idea to know your test results and keep a list of the medicines you take. Where can you learn more? Go to http://www.Keyade.com/  Enter T094 in the search box to learn more about \"Low Back Arthritis: Exercises. \"  Current as of: July 1, 2021               Content Version: 13.2  © 2006-2022 [employer]. Care instructions adapted under license by [employer] (which disclaims liability or warranty for this information). If you have questions about a medical condition or this instruction, always ask your healthcare professional. [place] [address] any warranty or liability for your use of this information. Knee Arthritis: Exercises  Introduction  Here are some examples of exercises for you to try. The exercises may be suggested for a condition or for rehabilitation. Start each exercise slowly. Ease off the exercises if you start to have pain. You will be told when to start these exercises and which ones will work best for you. How to do the exercises  Knee flexion with heel slide    1. Lie on your back with your knees bent. 2. Slide your heel back by bending your affected knee as far as you can. Then hook your other foot around your ankle to help pull your heel even farther back. 3. Hold for about 6 seconds, then rest for up to 10 seconds. 4. Repeat 8 to 12 times. 5. Switch legs and repeat steps 1 through 4, even if only one knee is sore. Quad sets    1. Sit with your affected leg straight and supported on the floor or a firm bed. Place a small, rolled-up towel under your knee. Your other leg should be bent, with that foot flat on the floor. 2. Tighten the thigh muscles of your affected leg by pressing the back of your knee down into the towel. 3. Hold for about 6 seconds, then rest for up to 10 seconds. 4. Repeat 8 to 12 times.   5. Switch legs and repeat steps 1 through 4, even if only one knee is sore. Straight-leg raises to the front    1. Lie on your back with your good knee bent so that your foot rests flat on the floor. Your affected leg should be straight. Make sure that your low back has a normal curve. You should be able to slip your hand in between the floor and the small of your back, with your palm touching the floor and your back touching the back of your hand. 2. Tighten the thigh muscles in your affected leg by pressing the back of your knee flat down to the floor. Hold your knee straight. 3. Keeping the thigh muscles tight and your leg straight, lift your affected leg up so that your heel is about 12 inches off the floor. Hold for about 6 seconds, then lower slowly. 4. Relax for up to 10 seconds between repetitions. 5. Repeat 8 to 12 times. 6. Switch legs and repeat steps 1 through 5, even if only one knee is sore. Active knee flexion    1. Lie on your stomach with your knees straight. If your kneecap is uncomfortable, roll up a washcloth and put it under your leg just above your kneecap. 2. Lift the foot of your affected leg by bending the knee so that you bring the foot up toward your buttock. If this motion hurts, try it without bending your knee quite as far. This may help you avoid any painful motion. 3. Slowly move your leg up and down. 4. Repeat 8 to 12 times. 5. Switch legs and repeat steps 1 through 4, even if only one knee is sore. Quadriceps stretch (facedown)    1. Lie flat on your stomach, and rest your face on the floor. 2. Wrap a towel or belt strap around the lower part of your affected leg. Then use the towel or belt strap to slowly pull your heel toward your buttock until you feel a stretch. 3. Hold for about 15 to 30 seconds, then relax your leg against the towel or belt strap. 4. Repeat 2 to 4 times. 5. Switch legs and repeat steps 1 through 4, even if only one knee is sore. Stationary exercise bike    1.  If you do not have a stationary exercise bike at home, you can find one to ride at your local health club or community center. 2. Adjust the height of the bike seat so that your knee is slightly bent when your leg is extended downward. If your knee hurts when the pedal reaches the top, you can raise the seat so that your knee does not bend as much. 3. Start slowly. At first, try to do 5 to 10 minutes of cycling with little to no resistance. Then increase your time and the resistance bit by bit until you can do 20 to 30 minutes without pain. 4. If you start to have pain, rest your knee until your pain gets back to the level that is normal for you. Or cycle for less time or with less effort. Follow-up care is a key part of your treatment and safety. Be sure to make and go to all appointments, and call your doctor if you are having problems. It's also a good idea to know your test results and keep a list of the medicines you take. Where can you learn more? Go to http://www.knott.com/  Enter C159 in the search box to learn more about \"Knee Arthritis: Exercises. \"  Current as of: July 1, 2021               Content Version: 13.2  © 2006-2022 [employer]. Care instructions adapted under license by [employer] (which disclaims liability or warranty for this information). If you have questions about a medical condition or this instruction, always ask your healthcare professional. [place] [address] any warranty or liability for your use of this information. Hip Arthritis: Exercises  Introduction  Here are some examples of exercises for you to try. The exercises may be suggested for a condition or for rehabilitation. Start each exercise slowly. Ease off the exercises if you start to have pain. You will be told when to start these exercises and which ones will work best for you. How to do the exercises  Straight-leg raises to the outside    1.  Lie on your side, with your affected hip on top.  2. Tighten the front thigh muscles of your top leg to keep your knee straight. 3. Keep your hip and your leg straight in line with the rest of your body, and keep your knee pointing forward. Do not drop your hip back. 4. Lift your top leg straight up toward the ceiling, about 12 inches off the floor. Hold for about 6 seconds, then slowly lower your leg. 5. Repeat 8 to 12 times. 6. Switch legs and repeat steps 1 through 5, even if only one hip is sore. Straight-leg raises to the inside    1. Lie on your side with your affected hip on the floor. 2. You can either prop up your other leg on a chair, or you can bend that knee and put that foot in front of your other knee. Do not drop your hip back. 3. Tighten the muscles on the front thigh of your bottom leg to straighten that knee. 4. Keep your kneecap pointing forward and your leg straight, and lift your bottom leg up toward the ceiling about 6 inches. Hold for about 6 seconds, then lower slowly. 5. Repeat 8 to 12 times. 6. Switch legs and repeat steps 1 through 5, even if only one hip is sore. Hip hike    1. Stand sideways on the bottom step of a staircase, and hold on to the banister or wall. 2. Keeping both knees straight, lift your good leg off the step and let it hang down. Then hike your good hip up to the same level as your affected hip or a little higher. 3. Repeat 8 to 12 times. 4. Switch legs and repeat steps 1 through 3, even if only one hip is sore. Bridging    1. Lie on your back with both knees bent. Your knees should be bent about 90 degrees. 2. Then push your feet into the floor, squeeze your buttocks, and lift your hips off the floor until your shoulders, hips, and knees are all in a straight line. 3. Hold for about 6 seconds as you continue to breathe normally, and then slowly lower your hips back down to the floor and rest for up to 10 seconds. 4. Repeat 8 to 12 times. Hamstring stretch (lying down)    1.  Lie flat on your back with your legs straight. If you feel discomfort in your back, place a small towel roll under your lower back. 2. Holding the back of your affected leg, lift your leg straight up and toward your body until you feel a stretch at the back of your thigh. 3. Hold the stretch for at least 30 seconds. 4. Repeat 2 to 4 times. 5. Switch legs and repeat steps 1 through 4, even if only one hip is sore. Standing quadriceps stretch    1. If you are not steady on your feet, hold on to a chair, counter, or wall. You can also lie on your stomach or your side to do this exercise. 2. Bend the knee of the leg you want to stretch, and reach behind you to grab the front of your foot or ankle with the hand on the same side. For example, if you are stretching your right leg, use your right hand. 3. Keeping your knees next to each other, pull your foot toward your buttock until you feel a gentle stretch across the front of your hip and down the front of your thigh. Your knee should be pointed directly to the ground, and not out to the side. 4. Hold the stretch for at least 15 to 30 seconds. 5. Repeat 2 to 4 times. 6. Switch legs and repeat steps 1 through 5, even if only one hip is sore. Hip rotator stretch    1. Lie on your back with both knees bent and your feet flat on the floor. 2. Put the ankle of your affected leg on your opposite thigh near your knee. 3. Use your hand to gently push your knee away from your body until you feel a gentle stretch around your hip. 4. Hold the stretch for 15 to 30 seconds. 5. Repeat 2 to 4 times. 6. Repeat steps 1 through 5, but this time use your hand to gently pull your knee toward your opposite shoulder. 7. Switch legs and repeat steps 1 through 6, even if only one hip is sore. Knee-to-chest    1. Lie on your back with your knees bent and your feet flat on the floor.   2. Bring your affected leg to your chest, keeping the other foot flat on the floor (or keeping the other leg straight, whichever feels better on your lower back). 3. Keep your lower back pressed to the floor. Hold for at least 15 to 30 seconds. 4. Relax, and lower the knee to the starting position. 5. Repeat 2 to 4 times. 6. Switch legs and repeat steps 1 through 5, even if only one hip is sore. 7. To get more stretch, put your other leg flat on the floor while pulling your knee to your chest.  Clamshell    1. Lie on your side, with your affected hip on top. Keep your feet and knees together and your knees bent. 2. Raise your top knee, but keep your feet together. Do not let your hips roll back. Your legs should open up like a clamshell. 3. Hold for 6 seconds. 4. Slowly lower your knee back down. Rest for 10 seconds. 5. Repeat 8 to 12 times. 6. Switch legs and repeat steps 1 through 5, even if only one hip is sore. Follow-up care is a key part of your treatment and safety. Be sure to make and go to all appointments, and call your doctor if you are having problems. It's also a good idea to know your test results and keep a list of the medicines you take. Where can you learn more? Go to http://www.gray.com/  Enter B085 in the search box to learn more about \"Hip Arthritis: Exercises. \"  Current as of: July 1, 2021               Content Version: 13.2  © 2006-2022 [employer]. Care instructions adapted under license by [employer] (which disclaims liability or warranty for this information). If you have questions about a medical condition or this instruction, always ask your healthcare professional. [address] any warranty or liability for your use of this information.

## 2022-04-12 NOTE — PROGRESS NOTES
Rodo Correia presents today for   Chief Complaint   Patient presents with    Back Pain       Is someone accompanying this pt? no    Is the patient using any DME equipment during OV? no    Depression Screening:  3 most recent PHQ Screens 10/12/2021   PHQ Not Done -   Little interest or pleasure in doing things Not at all   Feeling down, depressed, irritable, or hopeless Not at all   Total Score PHQ 2 0       Learning Assessment:  No flowsheet data found. Abuse Screening:  No flowsheet data found. Fall Risk  Fall Risk Assessment, last 12 mths 10/22/2020   Able to walk? Yes   Fall in past 12 months? No       OPIOID RISK TOOL  No flowsheet data found. Coordination of Care:  1. Have you been to the ER, urgent care clinic since your last visit? no  Hospitalized since your last visit? no    2. Have you seen or consulted any other health care providers outside of the [address] [place] [address] since your last visit? no Include any pap smears or colon screening.  no

## 2025-04-17 NOTE — PROGRESS NOTES
[facility]              [address]        [place], [address] [place]   [phone] fax    Plan of Care/ Statement of Necessity for Physical Therapy Services    Patient name: Sergei Vo Start of Care: 10/29/2019   Referral source: Candy Chase MD : [id number]    Medical Diagnosis: Right knee pain [M25.561]  [place]or: [place] MED[place] / Plan: [place] MEDICARE PART A & B / Product Type: Medicare /  Onset Date:DoS 10/1/2019    Treatment Diagnosis: Right Knee Pain, s/p Surgery   Prior Hospitalization: see medical history Provider#: [id number]   Medications: Verified on Patient summary List    Comorbidities: Arthritis, Diabetes Type II, HTN   Prior Level of Function: (I) Functional ADLs, (I) Self-Care ADLs, Work Full-Time, Driving, Ambulation without AD, No Fall History      The Plan of Care and following information is based on the information from the initial evaluation. Assessment:    Patient presents s/p right knee patellofemoral resurfacing (DoS 10/1/2019) secondary to chronic pain and functional limitations. Normal neurovascular screen with appropriate healing of surgical incision. Right knee AROM 0-5-117 degrees with poor activation of quadriceps musculature and right antalgic gait with ambulation. To emphasize return of functional knee AROM and strength to promote patient ease with return to PLOF.     Patient will continue to benefit from skilled PT services to modify and progress therapeutic interventions, address functional mobility deficits, address ROM deficits, address strength deficits, analyze and address soft tissue restrictions, analyze and cue movement patterns, analyze and modify body mechanics/ergonomics, assess and modify postural abnormalities, address imbalance/dizziness and instruct in home and community integration to attain remaining goals.     Key Information:    Surgical Incision: Appropriate healing of surgical incision without active nor dried drainage. Neurovascular Screen: Generalized intact sensation to light touch to bilateral lower leg and foot, Non-tender, supple calf upon palpation, 2+ Posterior tibialis pulse, 2+ Dorsalis pedis pulse     ROM / Strength                                                                                                                                                      AROM                     MMT (1-5)      Left Right Left Right   Hip Flexion     5 5     Extension     NT NT     Abduction     3+ 3+     ER     5 5     IR     5 5   Knee Flexion 138 117 5 5     Extension 5(0) (0)5 5 4+   *Assessed in supine    Evaluation Complexity History MEDIUM  Complexity : 1-2 comorbidities / personal factors will impact the outcome/ POC ; Examination MEDIUM Complexity : 3 Standardized tests and measures addressing body structure, function, activity limitation and / or participation in recreation  ;Presentation MEDIUM Complexity : Evolving with changing characteristics  ; Clinical Decision Making MEDIUM Complexity : FOTO score of 26-74  Overall Complexity Rating: MEDIUM  Problem List: pain affecting function, decrease ROM, decrease strength, edema affecting function, impaired gait/ balance, decrease ADL/ functional abilitiies, decrease activity tolerance, decrease flexibility/ joint mobility and decrease transfer abilities   Treatment Plan may include any combination of the following: Therapeutic exercise, Therapeutic activities, Neuromuscular re-education, Physical agent/modality, Gait/balance training, Manual therapy, Patient education, Self Care training, Functional mobility training, Home safety training and Stair training  Patient / Family readiness to learn indicated by: asking questions, trying to perform skills and interest  Persons(s) to be included in education: patient (P)  Barriers to Learning/Limitations: None  Patient Goal (s): I would like to gain more motion in my knee  Patient Self Reported Health Status: good  Rehabilitation Potential: good    Short Term Goals: To be accomplished in 3-4 weeks:  1. Patient will subjectively report full compliance with prescribed HEP. Eval: HEP provided  2. Patient will demonstrate right knee AROM 0-130 degrees to improve ease with dressing. Eval: Right Knee AROM 0 - 5 - 117 degrees  3. Patient will demonstrate ability to perform supine SLR x10 without quadriceps lag to improve ease with curb management. Eval: Supine Quadriceps Isometric: Poor activation of quadriceps with gluteal compensation    Long Term Goals: To be accomplished in 7-8 weeks:  1. Patient will demonstrate a significant functional improvement as demonstrated by a score of >/= 73 on FOTO. Eval: FOTO = 72  2. Patient will demonstrate left/right SLS (A[employer]x) >/= 30 seconds to improve stability on uneven surfaces. Eval: Left SLS (non-Airex) = 26 sec / Right SLS (non-Airex) = 30 sec  3. Patient will demonstrate left/right hip abduction MMT >/= 4+/5 to improve stability with recreational ADLs. Eval: Left Hip Abduction MMT = 3+/5, Right Hip Abduction MMT = 3+/5    Frequency / Duration: Patient to be seen 2 times per week for 8 weeks. Patient/ Caregiver education and instruction: Diagnosis, prognosis, self care, activity modification and exercises   [x]  Plan of care has been reviewed with PTA      Certification Period: 10/29/2019 - 12/28/2019  Nora Aguilar, PT 10/29/2019 8:33 AM  ________________________________________________________________________    I certify that the above Therapy Services are being furnished while the patient is under my care. I agree with the treatment plan and certify that this therapy is necessary.     Physician's Signature:____________Date:_________TIME:________    [employer], Date and Time must be completed for valid certification **  Please sign and return to [facility] [address]              [address] [facility] [address]        [place], [address] [place]   [phone] fax no...